# Patient Record
Sex: FEMALE | Race: WHITE | NOT HISPANIC OR LATINO | Employment: FULL TIME | URBAN - METROPOLITAN AREA
[De-identification: names, ages, dates, MRNs, and addresses within clinical notes are randomized per-mention and may not be internally consistent; named-entity substitution may affect disease eponyms.]

---

## 2017-05-15 ENCOUNTER — COMMUNICATION - HEALTHEAST (OUTPATIENT)
Dept: FAMILY MEDICINE | Facility: CLINIC | Age: 42
End: 2017-05-15

## 2017-05-15 DIAGNOSIS — N92.0 MENORRHAGIA: ICD-10-CM

## 2017-07-06 ENCOUNTER — COMMUNICATION - HEALTHEAST (OUTPATIENT)
Dept: TELEHEALTH | Facility: CLINIC | Age: 42
End: 2017-07-06

## 2017-07-06 ENCOUNTER — OFFICE VISIT - HEALTHEAST (OUTPATIENT)
Dept: INTERNAL MEDICINE | Facility: CLINIC | Age: 42
End: 2017-07-06

## 2017-07-06 DIAGNOSIS — G56.82 PINCHED NERVE IN SHOULDER, LEFT: ICD-10-CM

## 2017-07-06 DIAGNOSIS — M54.9 UPPER BACK PAIN ON LEFT SIDE: ICD-10-CM

## 2017-07-07 ENCOUNTER — COMMUNICATION - HEALTHEAST (OUTPATIENT)
Dept: INTERNAL MEDICINE | Facility: CLINIC | Age: 42
End: 2017-07-07

## 2017-07-10 ENCOUNTER — OFFICE VISIT - HEALTHEAST (OUTPATIENT)
Dept: FAMILY MEDICINE | Facility: CLINIC | Age: 42
End: 2017-07-10

## 2017-07-10 DIAGNOSIS — M25.512 LEFT SHOULDER PAIN: ICD-10-CM

## 2017-07-10 DIAGNOSIS — M54.9 MID BACK PAIN ON LEFT SIDE: ICD-10-CM

## 2017-07-10 ASSESSMENT — MIFFLIN-ST. JEOR: SCORE: 1723.17

## 2017-07-23 ENCOUNTER — COMMUNICATION - HEALTHEAST (OUTPATIENT)
Dept: SCHEDULING | Facility: CLINIC | Age: 42
End: 2017-07-23

## 2017-07-24 ENCOUNTER — OFFICE VISIT - HEALTHEAST (OUTPATIENT)
Dept: FAMILY MEDICINE | Facility: CLINIC | Age: 42
End: 2017-07-24

## 2017-07-24 DIAGNOSIS — M79.602 LEFT ARM PAIN: ICD-10-CM

## 2017-07-24 DIAGNOSIS — M54.12 CERVICAL RADICULOPATHY: ICD-10-CM

## 2017-07-25 ENCOUNTER — HOSPITAL ENCOUNTER (OUTPATIENT)
Dept: MRI IMAGING | Facility: HOSPITAL | Age: 42
Discharge: HOME | End: 2017-07-25
Attending: FAMILY MEDICINE

## 2017-07-25 DIAGNOSIS — M54.12 CERVICAL RADICULOPATHY: ICD-10-CM

## 2017-07-26 ENCOUNTER — AMBULATORY - HEALTHEAST (OUTPATIENT)
Dept: FAMILY MEDICINE | Facility: CLINIC | Age: 42
End: 2017-07-26

## 2017-07-26 ENCOUNTER — COMMUNICATION - HEALTHEAST (OUTPATIENT)
Dept: FAMILY MEDICINE | Facility: CLINIC | Age: 42
End: 2017-07-26

## 2017-07-26 DIAGNOSIS — M54.12 CERVICAL RADICULOPATHY AT C7: ICD-10-CM

## 2017-08-01 ENCOUNTER — HOSPITAL ENCOUNTER (OUTPATIENT)
Dept: PHYSICAL MEDICINE AND REHAB | Facility: CLINIC | Age: 42
Discharge: HOME | End: 2017-08-01
Attending: FAMILY MEDICINE

## 2017-08-01 DIAGNOSIS — M54.12 RADICULITIS OF LEFT CERVICAL REGION: ICD-10-CM

## 2017-08-01 DIAGNOSIS — M50.20 CERVICAL DISC HERNIATION: ICD-10-CM

## 2017-08-01 DIAGNOSIS — M48.02 CERVICAL STENOSIS OF SPINAL CANAL: ICD-10-CM

## 2017-08-01 RX ORDER — GABAPENTIN 100 MG/1
300 CAPSULE ORAL 3 TIMES DAILY
Qty: 270 CAPSULE | Refills: 3 | Status: SHIPPED | OUTPATIENT
Start: 2017-08-01

## 2017-08-02 ENCOUNTER — OFFICE VISIT - HEALTHEAST (OUTPATIENT)
Dept: PHYSICAL THERAPY | Facility: REHABILITATION | Age: 42
End: 2017-08-02

## 2017-08-02 DIAGNOSIS — R29.3 POOR POSTURE: ICD-10-CM

## 2017-08-02 DIAGNOSIS — M54.12 CERVICAL RADICULOPATHY: ICD-10-CM

## 2017-08-02 DIAGNOSIS — M43.6 STIFFNESS OF CERVICAL SPINE: ICD-10-CM

## 2017-08-04 ENCOUNTER — OFFICE VISIT - HEALTHEAST (OUTPATIENT)
Dept: PHYSICAL THERAPY | Facility: REHABILITATION | Age: 42
End: 2017-08-04

## 2017-08-04 DIAGNOSIS — R29.3 POOR POSTURE: ICD-10-CM

## 2017-08-04 DIAGNOSIS — M43.6 STIFFNESS OF CERVICAL SPINE: ICD-10-CM

## 2017-08-04 DIAGNOSIS — M54.12 CERVICAL RADICULOPATHY: ICD-10-CM

## 2017-08-07 ENCOUNTER — OFFICE VISIT - HEALTHEAST (OUTPATIENT)
Dept: PHYSICAL THERAPY | Facility: REHABILITATION | Age: 42
End: 2017-08-07

## 2017-08-07 DIAGNOSIS — R29.3 POOR POSTURE: ICD-10-CM

## 2017-08-07 DIAGNOSIS — M43.6 STIFFNESS OF CERVICAL SPINE: ICD-10-CM

## 2017-08-07 DIAGNOSIS — M54.12 CERVICAL RADICULOPATHY: ICD-10-CM

## 2017-08-09 ENCOUNTER — OFFICE VISIT - HEALTHEAST (OUTPATIENT)
Dept: PHYSICAL THERAPY | Facility: REHABILITATION | Age: 42
End: 2017-08-09

## 2017-08-09 DIAGNOSIS — M43.6 STIFFNESS OF CERVICAL SPINE: ICD-10-CM

## 2017-08-09 DIAGNOSIS — M54.12 CERVICAL RADICULOPATHY: ICD-10-CM

## 2017-08-09 DIAGNOSIS — R29.3 POOR POSTURE: ICD-10-CM

## 2017-08-14 ENCOUNTER — OFFICE VISIT - HEALTHEAST (OUTPATIENT)
Dept: PHYSICAL THERAPY | Facility: REHABILITATION | Age: 42
End: 2017-08-14

## 2017-08-14 DIAGNOSIS — M43.6 STIFFNESS OF CERVICAL SPINE: ICD-10-CM

## 2017-08-14 DIAGNOSIS — M54.12 CERVICAL RADICULOPATHY: ICD-10-CM

## 2017-08-14 DIAGNOSIS — R29.3 POOR POSTURE: ICD-10-CM

## 2017-08-16 ENCOUNTER — OFFICE VISIT - HEALTHEAST (OUTPATIENT)
Dept: PHYSICAL THERAPY | Facility: REHABILITATION | Age: 42
End: 2017-08-16

## 2017-08-16 DIAGNOSIS — M43.6 STIFFNESS OF CERVICAL SPINE: ICD-10-CM

## 2017-08-16 DIAGNOSIS — R29.3 POOR POSTURE: ICD-10-CM

## 2017-08-16 DIAGNOSIS — M54.12 CERVICAL RADICULOPATHY: ICD-10-CM

## 2017-08-21 ENCOUNTER — OFFICE VISIT - HEALTHEAST (OUTPATIENT)
Dept: PHYSICAL THERAPY | Facility: REHABILITATION | Age: 42
End: 2017-08-21

## 2017-08-21 DIAGNOSIS — M43.6 STIFFNESS OF CERVICAL SPINE: ICD-10-CM

## 2017-08-21 DIAGNOSIS — M54.12 CERVICAL RADICULOPATHY: ICD-10-CM

## 2017-08-21 DIAGNOSIS — R29.3 POOR POSTURE: ICD-10-CM

## 2017-08-22 ENCOUNTER — COMMUNICATION - HEALTHEAST (OUTPATIENT)
Dept: PHYSICAL MEDICINE AND REHAB | Facility: CLINIC | Age: 42
End: 2017-08-22

## 2017-08-25 ENCOUNTER — OFFICE VISIT - HEALTHEAST (OUTPATIENT)
Dept: PHYSICAL THERAPY | Facility: REHABILITATION | Age: 42
End: 2017-08-25

## 2017-08-25 DIAGNOSIS — M54.12 CERVICAL RADICULOPATHY: ICD-10-CM

## 2017-08-25 DIAGNOSIS — M43.6 STIFFNESS OF CERVICAL SPINE: ICD-10-CM

## 2017-08-25 DIAGNOSIS — R29.3 POOR POSTURE: ICD-10-CM

## 2017-08-28 ENCOUNTER — OFFICE VISIT - HEALTHEAST (OUTPATIENT)
Dept: PHYSICAL THERAPY | Facility: REHABILITATION | Age: 42
End: 2017-08-28

## 2017-08-28 DIAGNOSIS — M43.6 STIFFNESS OF CERVICAL SPINE: ICD-10-CM

## 2017-08-28 DIAGNOSIS — R29.3 POOR POSTURE: ICD-10-CM

## 2017-08-28 DIAGNOSIS — M54.12 CERVICAL RADICULOPATHY: ICD-10-CM

## 2017-08-30 ENCOUNTER — HOSPITAL ENCOUNTER (OUTPATIENT)
Dept: PHYSICAL MEDICINE AND REHAB | Facility: CLINIC | Age: 42
Discharge: HOME | End: 2017-08-30
Attending: NURSE PRACTITIONER

## 2017-08-30 DIAGNOSIS — M48.02 CERVICAL STENOSIS OF SPINAL CANAL: ICD-10-CM

## 2017-08-30 DIAGNOSIS — M50.20 CERVICAL DISC HERNIATION: ICD-10-CM

## 2017-08-30 DIAGNOSIS — M54.12 RADICULITIS OF LEFT CERVICAL REGION: ICD-10-CM

## 2017-09-01 ENCOUNTER — HOSPITAL ENCOUNTER (OUTPATIENT)
Dept: PHYSICAL MEDICINE AND REHAB | Facility: CLINIC | Age: 42
Discharge: HOME | End: 2017-09-01
Attending: PHYSICAL MEDICINE & REHABILITATION

## 2017-09-01 ENCOUNTER — OFFICE VISIT - HEALTHEAST (OUTPATIENT)
Dept: PHYSICAL THERAPY | Facility: REHABILITATION | Age: 42
End: 2017-09-01

## 2017-09-01 DIAGNOSIS — M50.20 CERVICAL DISC HERNIATION: ICD-10-CM

## 2017-09-01 DIAGNOSIS — M43.6 STIFFNESS OF CERVICAL SPINE: ICD-10-CM

## 2017-09-01 DIAGNOSIS — M54.12 CERVICAL RADICULOPATHY: ICD-10-CM

## 2017-09-01 DIAGNOSIS — M54.12 RADICULITIS OF LEFT CERVICAL REGION: ICD-10-CM

## 2017-09-01 DIAGNOSIS — R29.3 POOR POSTURE: ICD-10-CM

## 2017-09-08 ENCOUNTER — OFFICE VISIT - HEALTHEAST (OUTPATIENT)
Dept: PHYSICAL THERAPY | Facility: REHABILITATION | Age: 42
End: 2017-09-08

## 2017-09-08 DIAGNOSIS — M54.12 CERVICAL RADICULOPATHY: ICD-10-CM

## 2017-09-08 DIAGNOSIS — M43.6 STIFFNESS OF CERVICAL SPINE: ICD-10-CM

## 2017-09-08 DIAGNOSIS — R29.3 POOR POSTURE: ICD-10-CM

## 2017-09-13 ENCOUNTER — OFFICE VISIT - HEALTHEAST (OUTPATIENT)
Dept: PHYSICAL THERAPY | Facility: REHABILITATION | Age: 42
End: 2017-09-13

## 2017-09-13 DIAGNOSIS — M43.6 STIFFNESS OF CERVICAL SPINE: ICD-10-CM

## 2017-09-13 DIAGNOSIS — R29.3 POOR POSTURE: ICD-10-CM

## 2017-09-13 DIAGNOSIS — M54.12 CERVICAL RADICULOPATHY: ICD-10-CM

## 2017-09-15 ENCOUNTER — HOSPITAL ENCOUNTER (OUTPATIENT)
Dept: PHYSICAL MEDICINE AND REHAB | Facility: CLINIC | Age: 42
Discharge: HOME | End: 2017-09-15
Attending: NURSE PRACTITIONER

## 2017-09-15 DIAGNOSIS — M50.20 CERVICAL DISC HERNIATION: ICD-10-CM

## 2017-09-15 DIAGNOSIS — M54.12 RADICULITIS OF LEFT CERVICAL REGION: ICD-10-CM

## 2017-09-15 DIAGNOSIS — M79.18 CERVICAL MYOFASCIAL PAIN SYNDROME: ICD-10-CM

## 2017-09-15 DIAGNOSIS — M48.02 CERVICAL STENOSIS OF SPINAL CANAL: ICD-10-CM

## 2017-09-15 RX ORDER — NAPROXEN 500 MG/1
500 TABLET ORAL 2 TIMES DAILY PRN
Qty: 42 TABLET | Refills: 1 | Status: SHIPPED | OUTPATIENT
Start: 2017-09-15

## 2017-09-15 RX ORDER — CYCLOBENZAPRINE HCL 5 MG
5-10 TABLET ORAL 3 TIMES DAILY PRN
Qty: 42 TABLET | Refills: 1 | Status: SHIPPED | OUTPATIENT
Start: 2017-09-15

## 2017-09-20 ENCOUNTER — OFFICE VISIT - HEALTHEAST (OUTPATIENT)
Dept: PHYSICAL THERAPY | Facility: REHABILITATION | Age: 42
End: 2017-09-20

## 2017-09-20 DIAGNOSIS — R29.3 POOR POSTURE: ICD-10-CM

## 2017-09-20 DIAGNOSIS — M54.12 CERVICAL RADICULOPATHY: ICD-10-CM

## 2017-09-20 DIAGNOSIS — M43.6 STIFFNESS OF CERVICAL SPINE: ICD-10-CM

## 2017-09-27 ENCOUNTER — OFFICE VISIT - HEALTHEAST (OUTPATIENT)
Dept: PHYSICAL THERAPY | Facility: REHABILITATION | Age: 42
End: 2017-09-27

## 2017-09-27 DIAGNOSIS — M54.12 CERVICAL RADICULOPATHY: ICD-10-CM

## 2017-09-27 DIAGNOSIS — R29.3 POOR POSTURE: ICD-10-CM

## 2017-09-27 DIAGNOSIS — M43.6 STIFFNESS OF CERVICAL SPINE: ICD-10-CM

## 2017-10-09 ENCOUNTER — OFFICE VISIT - HEALTHEAST (OUTPATIENT)
Dept: PHYSICAL THERAPY | Facility: REHABILITATION | Age: 42
End: 2017-10-09

## 2017-10-09 DIAGNOSIS — R29.3 POOR POSTURE: ICD-10-CM

## 2017-10-09 DIAGNOSIS — M43.6 STIFFNESS OF CERVICAL SPINE: ICD-10-CM

## 2017-10-09 DIAGNOSIS — M54.12 CERVICAL RADICULOPATHY: ICD-10-CM

## 2017-10-12 ENCOUNTER — OFFICE VISIT - HEALTHEAST (OUTPATIENT)
Dept: FAMILY MEDICINE | Facility: CLINIC | Age: 42
End: 2017-10-12

## 2017-10-12 DIAGNOSIS — N92.0 MENORRHAGIA: ICD-10-CM

## 2017-10-12 DIAGNOSIS — E66.9 OBESITY: ICD-10-CM

## 2017-10-12 DIAGNOSIS — Z23 NEED FOR IMMUNIZATION AGAINST INFLUENZA: ICD-10-CM

## 2017-10-12 DIAGNOSIS — R03.0 ELEVATED BLOOD PRESSURE READING: ICD-10-CM

## 2017-10-12 DIAGNOSIS — Z00.00 HEALTH CARE MAINTENANCE: ICD-10-CM

## 2017-10-12 LAB
CHOLEST SERPL-MCNC: 143 MG/DL
FASTING STATUS PATIENT QL REPORTED: YES
HDLC SERPL-MCNC: 41 MG/DL
LDLC SERPL CALC-MCNC: 73 MG/DL
TRIGL SERPL-MCNC: 146 MG/DL

## 2017-10-12 ASSESSMENT — MIFFLIN-ST. JEOR: SCORE: 1766.55

## 2017-10-17 LAB
HPV INTERPRETATION - HISTORICAL: NORMAL
HPV INTERPRETER - HISTORICAL: NORMAL

## 2017-10-18 LAB
BKR LAB AP ABNORMAL BLEEDING: NO
BKR LAB AP BIRTH CONTROL/HORMONES: NORMAL
BKR LAB AP CERVICAL APPEARANCE: NORMAL
BKR LAB AP GYN ADEQUACY: NORMAL
BKR LAB AP GYN INTERPRETATION: NORMAL
BKR LAB AP HPV REFLEX: NORMAL
BKR LAB AP LMP: NORMAL
BKR LAB AP PATIENT STATUS: NORMAL
BKR LAB AP PREVIOUS ABNORMAL: NORMAL
BKR LAB AP PREVIOUS NORMAL: 2014
HIGH RISK?: NO
PATH REPORT.COMMENTS IMP SPEC: NORMAL
RESULT FLAG (HE HISTORICAL CONVERSION): NORMAL

## 2017-10-25 ENCOUNTER — OFFICE VISIT - HEALTHEAST (OUTPATIENT)
Dept: PHYSICAL THERAPY | Facility: REHABILITATION | Age: 42
End: 2017-10-25

## 2017-10-25 DIAGNOSIS — M54.12 CERVICAL RADICULOPATHY: ICD-10-CM

## 2017-10-25 DIAGNOSIS — M43.6 STIFFNESS OF CERVICAL SPINE: ICD-10-CM

## 2017-10-25 DIAGNOSIS — R29.3 POOR POSTURE: ICD-10-CM

## 2017-11-09 ENCOUNTER — COMMUNICATION - HEALTHEAST (OUTPATIENT)
Dept: FAMILY MEDICINE | Facility: CLINIC | Age: 42
End: 2017-11-09

## 2017-11-15 ENCOUNTER — COMMUNICATION - HEALTHEAST (OUTPATIENT)
Dept: FAMILY MEDICINE | Facility: CLINIC | Age: 42
End: 2017-11-15

## 2017-11-15 ENCOUNTER — OFFICE VISIT - HEALTHEAST (OUTPATIENT)
Dept: PHYSICAL THERAPY | Facility: REHABILITATION | Age: 42
End: 2017-11-15

## 2017-11-15 DIAGNOSIS — M43.6 STIFFNESS OF CERVICAL SPINE: ICD-10-CM

## 2017-11-15 DIAGNOSIS — M54.12 CERVICAL RADICULOPATHY: ICD-10-CM

## 2017-11-15 DIAGNOSIS — R29.3 POOR POSTURE: ICD-10-CM

## 2017-11-16 ENCOUNTER — COMMUNICATION - HEALTHEAST (OUTPATIENT)
Dept: FAMILY MEDICINE | Facility: CLINIC | Age: 42
End: 2017-11-16

## 2017-11-29 ENCOUNTER — OFFICE VISIT - HEALTHEAST (OUTPATIENT)
Dept: FAMILY MEDICINE | Facility: CLINIC | Age: 42
End: 2017-11-29

## 2017-11-29 DIAGNOSIS — N92.0 MENORRHAGIA: ICD-10-CM

## 2017-11-29 DIAGNOSIS — R03.0 ELEVATED BLOOD PRESSURE READING: ICD-10-CM

## 2017-11-29 DIAGNOSIS — Z01.818 PRE-OPERATIVE CLEARANCE: ICD-10-CM

## 2017-11-29 ASSESSMENT — MIFFLIN-ST. JEOR: SCORE: 1762.58

## 2017-12-04 ASSESSMENT — MIFFLIN-ST. JEOR: SCORE: 1748.12

## 2017-12-05 ENCOUNTER — ANESTHESIA - HEALTHEAST (OUTPATIENT)
Dept: SURGERY | Facility: AMBULATORY SURGERY CENTER | Age: 42
End: 2017-12-05

## 2017-12-06 ENCOUNTER — SURGERY - HEALTHEAST (OUTPATIENT)
Dept: SURGERY | Facility: AMBULATORY SURGERY CENTER | Age: 42
End: 2017-12-06

## 2017-12-06 ASSESSMENT — MIFFLIN-ST. JEOR: SCORE: 1748.12

## 2018-05-11 ENCOUNTER — TELEPHONE (OUTPATIENT)
Dept: OTHER | Facility: CLINIC | Age: 43
End: 2018-05-11

## 2018-05-11 NOTE — TELEPHONE ENCOUNTER
5/11/2018    Call Regarding [facility]boarding [facility] other     Attempt 1    Message on voicemail    Comments:       Outreach   Kaity Giraldo

## 2018-05-30 NOTE — TELEPHONE ENCOUNTER
5/30/2018    Call Regarding [facility]boarding [facility] OTHER    Attempt 2    Message on voicemail     Comments: no dep      Outreach   SARTHAK

## 2018-06-11 NOTE — TELEPHONE ENCOUNTER
6/11/2018    Call Regarding Onboarding [facility] OTHER     Attempt 3    Message on voicemail     Comments:       Outreach   SB

## 2021-05-31 VITALS — HEIGHT: 65 IN | WEIGHT: 239.25 LBS | BODY MASS INDEX: 39.86 KG/M2

## 2021-05-31 VITALS — BODY MASS INDEX: 41.74 KG/M2 | WEIGHT: 247 LBS

## 2021-05-31 VITALS — BODY MASS INDEX: 41.09 KG/M2 | WEIGHT: 243.13 LBS

## 2021-05-31 VITALS — BODY MASS INDEX: 41.07 KG/M2 | WEIGHT: 243 LBS

## 2021-05-31 VITALS — HEIGHT: 65 IN | WEIGHT: 247.06 LBS | BODY MASS INDEX: 41.16 KG/M2

## 2021-05-31 VITALS — WEIGHT: 243 LBS | BODY MASS INDEX: 40.48 KG/M2 | HEIGHT: 65 IN

## 2021-05-31 VITALS — WEIGHT: 242.4 LBS | BODY MASS INDEX: 40.34 KG/M2

## 2021-05-31 VITALS — BODY MASS INDEX: 41.02 KG/M2 | WEIGHT: 246.19 LBS | HEIGHT: 65 IN

## 2021-06-11 NOTE — PROGRESS NOTES
Assessment/Plan:     1. Mid back pain on left side  XR Cervical Spine 2 - 3 VWS    XR Thoracic Spine 2 VWS   2. Left shoulder pain         Diagnoses and all orders for this visit:    Mid back pain on left side  -     XR Cervical Spine 2 - 3 VWS  -     XR Thoracic Spine 2 VWS  -Discussed x-ray findings with patient.  Some mild degenerative changes noted.  -Discussed that symptoms are consistent with a pinched nerve.  Discussed treatment options.  Elected to treat with Medrol Dosepak.  Prescription sent to pharmacy.  Counseled on use of medication and side effects.  Will also treat with cyclobenzaprine to help with muscle relaxation.  Prescription for this was also sent to pharmacy and she was counseled on use of medication and common side effects.  She has a few tablets left of tramadol.  She can continue to use these.  Do not combine with cyclobenzaprine due to risk of drowsiness and respiratory depression.  Follow-up if symptoms not improving with these measures.  She can also continue with topical heat, ice and massage as well as stretching exercises.  She was also advised to avoid NSAIDs while taking the Medrol Dosepak but she is okay to take acetaminophen.    Left shoulder pain    Other orders  -     methylPREDNISolone (MEDROL DOSEPACK) 4 mg tablet; follow package directions  Dispense: 21 tablet; Refill: 0  -     cyclobenzaprine (FLEXERIL) 5 MG tablet; Take 1 tablet (5 mg total) by mouth 3 (three) times a day as needed for muscle spasms.  Dispense: 30 tablet; Refill: 0           Subjective:      Lorraine Knapp is a 41 y.o. female who comes into clinic today with concern about left shoulder and upper back pain.  She reports that she had a change in position at work and is now doing more of a desk job.  For the last 3 or 4 weeks she has noticed some intermittent achiness in the left shoulder and arm as well as occasional tingling in the fingers of her left hand.  She has not had any recent trauma or  injuries.  She did see a chiropractor on June 27.  An adjustment was done.  The following week she developed increased and more persistent/constant pain.  She was not able to sleep because of the discomfort.  She went to the walk-in clinic a couple of days ago.  No imaging was done.  She was diagnosed with a pinched nerve.  She was given tramadol.  She has been taking this and this is providing minimal relief.  She had to miss work Thursday and Friday of last week.  She has been sleeping in a recliner because she cannot get comfortable in her bed.  Pain is located in the left upper back area around the scapula and goes down into the left arm and forearm.  She has noticed a little bit of weakness in the left arm.  She has difficulty holding her cereal bowl with her left hand.  She has also tried Advil and Aleve.  Has tried topical heat and ice as well as a salon pause patch.  She has tried massage.  None of these methods have provided any significant relief of her discomfort.  She feels that there is a tight muscle knot in the area that she is not able to work out.  We reviewed allergies and medications.  She has no other concerns or questions today.    Current Outpatient Prescriptions   Medication Sig Dispense Refill     cetirizine (ZYRTEC) 10 mg cap Take 10 mg by mouth daily. 30 capsule 0     levonorgestrel-ethinyl estradiol (AVIANE,ALESSE,LESSINA) 0.1-20 mg-mcg per tablet Take 1 tablet by mouth daily. 90 tablet 1     multivitamin therapeutic (THERAGRAN) tablet Take 1 tablet by mouth daily.       traMADol (ULTRAM) 50 mg tablet Take 1 tablet (50 mg total) by mouth every 6 (six) hours. 15 tablet 0     cyclobenzaprine (FLEXERIL) 5 MG tablet Take 1 tablet (5 mg total) by mouth 3 (three) times a day as needed for muscle spasms. 30 tablet 0     methylPREDNISolone (MEDROL DOSEPACK) 4 mg tablet follow package directions 21 tablet 0     No current facility-administered medications for this visit.        Past Medical  "History, Family History, and Social History reviewed.  Past Medical History:   Diagnosis Date     Seasonal allergies      TMJ (temporomandibular joint disorder)      Past Surgical History:   Procedure Laterality Date      SECTION      and 2008     TEMPOROMANDIBULAR JOINT SURGERY Bilateral 1994     Review of patient's allergies indicates no known allergies.  Family History   Problem Relation Age of Onset     Diabetes Mother      Hyperlipidemia Mother      Hypertension Mother      No Medical Problems Son      Diabetes Maternal Uncle      Hyperlipidemia Maternal Grandmother      Diabetes Maternal Grandfather      No Medical Problems Son      Prostate cancer Maternal Uncle      Social History     Social History     Marital status:      Spouse name: N/A     Number of children: N/A     Years of education: N/A     Occupational History     Not on file.     Social History Main Topics     Smoking status: Never Smoker     Smokeless tobacco: Not on file     Alcohol use No     Drug use: No     Sexual activity: Yes     Partners: Male     Birth control/ protection: Pill, Surgical     Other Topics Concern     Not on file     Social History Narrative         Review of systems is as stated in HPI, and the remainder of the 10 system review is otherwise negative.    Objective:     Vitals:    07/10/17 1423 07/10/17 1514   BP: (!) 152/98 150/80   Patient Site: Right Arm    Patient Position: Sitting    Cuff Size: Adult Large    Pulse: 95    Temp: 98.1  F (36.7  C)    TempSrc: Tympanic    SpO2: 98%    Weight: (!) 239 lb 4 oz (108.5 kg)    Height: 5' 4.5\" (1.638 m)     Body mass index is 40.43 kg/(m^2).    General appearance: alert, appears stated age and cooperative  Head: Normocephalic, without obvious abnormality, atraumatic  Neck: supple, symmetrical, trachea midline  Extremities: extremities normal, atraumatic, no cyanosis or edema  Neurologic: Grossly normal; normal reflexes and strength in bilateral upper " extremities.  Musculoskeletal: No tenderness over cervical or thoracic spine.  There is some tenderness in the left trapezius muscle between the scapula and spine.  No tenderness in the left acromioclavicular joint or in the region of the left supraspinatus tendon or left biceps tendon.  On range of motion testing, she indicates discomfort with extremes of abduction and internal range of motion in the left arm.  She has discomfort with flexion and extension movements of the neck as well as turning head from left to right    Results: X-ray of the cervical and thoracic spine were performed in clinic.  I personally reviewed these x-rays.  These showed some mild degenerative changes but no other significant bony abnormalities.          This note has been dictated using voice recognition software. Any grammatical or context distortions are unintentional and inherent to the the software.

## 2021-06-11 NOTE — PROGRESS NOTES
[facility] Note  Patient Name: Lorraine Knapp  Patient Age: 41 y.o.  YOB: 1975  MRN: [id number]  ?  Date of Visit: 7/6/2017  Reason for Office Visit:   Chief Complaint   Patient presents with     Shoulder Pain     Left x 2 weeks Pain started atfer having a adjustment        HPI: Lorraine Knapp 41 y.o. female who presents to clinic for left upper back pain x 2 weeks. Started after having an adjustment at the chiropractor. Pain is located around upper trapezius and has some pinching radiating down right arm. No weakness, numbness. More pain associated with certain positions. Worse at night when lying on back the past 2 nights. Took ibuprofen which helps.     Review of Systems: As noted in HPI     Current Scheduled Meds:  Outpatient Encounter Prescriptions as of 7/6/2017   Medication Sig Dispense Refill     cetirizine (ZYRTEC) 10 mg cap Take 10 mg by mouth daily. 30 capsule 0     levonorgestrel-ethinyl estradiol (AVIANE,ALESSE,LESSINA) 0.1-20 mg-mcg per tablet Take 1 tablet by mouth daily. 90 tablet 1     multivitamin therapeutic (THERAGRAN) tablet Take 1 tablet by mouth daily.       diphenhydrAMINE (BENADRYL) 25 mg tablet Take 25 mg by mouth as needed for itching or sleep.       fexofenadine (ALLEGRA) 180 MG tablet Take 180 mg by mouth daily.       No facility-administered encounter medications on file as of 7/6/2017.        Objective / Physical Examination:  /80  Pulse 86  Temp 98.3  F (36.8  C) (Oral)   Wt (!) 242 lb 6.4 oz (110 kg)  BMI 40.34 kg/m2  Wt Readings from Last 3 Encounters:   07/06/17 (!) 242 lb 6.4 oz (110 kg)   07/03/16 (!) 240 lb 12.8 oz (109.2 kg)   04/27/16 (!) 238 lb 4 oz (108.1 kg)     Body mass index is 40.34 kg/(m^2). (>25?)    General Appearance: Alert and oriented in no acute distress  Back: Symmetrical and nontender  Extremities: Shoulder:  Symmetrical bilaterally, FROM flex/ex/IR/ER/abduction/adduction, No erythema or edema, Nontender to  palpation, Negative:  DardenFreddie velazquez, [place], Speeds, empty can, 5/5 strength biceps/triceps/, Radial pulse full. Cap refill <2 seconds, Sensory intact to light touch distally.  Integumentary: Warm and dry. Without suspicious looking lesions  Neuro: Alert and oriented, follows commands appropriately. Grossly normal. Cranial nerves II-XII intact and normal. Gait normal     Assessment / Plan / Medical Decision Making:      Encounter Diagnoses   Name Primary?     Upper back pain on left side Yes     Pinched nerve in shoulder, left         1. Upper back pain on left side    2. Pinched nerve in shoulder, left    Likely strain or nerve impingement. spurling, neer's negative   Recommend stretching exercises, massage, heat/ice  Take 2 aleve every 12 hours for nex 3 days with food   Would avoid chiropractic adjustments for now     Total time spent with patient was 15 minutes with >50% of time spent in face-to-face counseling regarding the above plan     Nir New MD  [facility]

## 2021-06-12 NOTE — PROGRESS NOTES
ASSESSMENT: Lorraine Knapp is a 41 y.o. female presents for consultation at the request of HE PCP Kisha Hernandez MD, with past medical history significant for overweight, who presents today for new patient evaluation of acute left cervical radiculitis in a C7 dermatomal pattern ongoing ×1 month with paresthesias.    It is strong on exam however does have sensation deficit in a C7 dermatomal pattern.     NDI Score: 24     WHO-5: 19, patient not interested in behavioral health services     PHQ-2: 2    Diagnoses and all orders for this visit:    Radiculitis of left cervical region  -     Ambulatory referral to PT/OT  -     gabapentin (NEURONTIN) 100 MG capsule; Take 300 mg by mouth 3 (three) times a day. Follow Gabapentin Dosing chart given  Dispense: 270 capsule; Refill: 3  -     naproxen (EC NAPROSYN) 500 MG EC tablet; Take 1 tablet (500 mg total) by mouth 3 (three) times a day as needed.  Dispense: 42 tablet; Refill: 1  -     HYDROcodone-acetaminophen 5-325 mg per tablet; Take 1 tablet by mouth 2 (two) times a day as needed for pain (Severe pain).  Dispense: 14 tablet; Refill: 0    Cervical disc herniation  -     Ambulatory referral to PT/OT    Cervical stenosis of spinal canal  -     Ambulatory referral to PT/OT       PLAN:  Reviewed spine anatomy and disease process. Discussed diagnosis and treatment options with the patient today. A shared decision making model was used. The patient's values and choices were respected. The following represents what was discussed and decided upon by the provider and the patient.     -DIAGNOSTIC TESTS:  Images were personally reviewed and interpreted.   --Reviewed cervical spine MRI today which reveals C6-7 superimposed small left posterior lateral disc protrusion with moderate central canal stenosis and effacement of the left C7 nerve root with mild bilateral foraminal stenosis.  There is also a C5-6 shallow left paracentral disc protrusion that looks less pronounced also  contributing to moderate central canal stenosis with left C6 nerve root compression however no foraminal stenosis noted.  Otherwise degenerative spondylosis noted.    -PHYSICAL THERAPY: Physical therapy [facility] for core strengthening and nerve glide exercises.  Discussed the importance of core strengthening, ROM, stretching exercises with the patient and how each of these entities is important in decreasing pain.  Explained to the patient that the purpose of physical therapy is to teach the patient a home exercise program.  These exercises need to be performed every day in order to decrease pain and prevent future occurrences of pain.  Likened it to brushing one's teeth.      -MEDICATIONS: Prescribed gabapentin 100 mg to titrate up to 3 tablets 3 times a day as tolerated for radicular pain.  -Prescribed naproxen 500 mg 1 tablet 3 times daily as needed for pain.  -Also refilled hydrocodone 5/325 mg, 1 tablet twice daily as needed for severe breakthrough pain, #14 tablets given for 7 days worth.  Discussed side effects of medications and proper use. Patient verbalized understanding.    -INTERVENTIONS: Discussed the possibility of a left C6-7 transfemoral epidural steroid injection to see if he can get relief of her left cervical radiculitis in a C7 dermatomal pattern.  Is a left posterolateral disc protrusion with moderate central canal stenosis and left C7 nerve root compression, however there is also left disc herniation at C5-6.    -PATIENT EDUCATION: 35 minutes of total visit time was spent face to face with the patient today, 60 % of the visit was spent on counseling, education, and coordinating care.   -10  minutes spent outside of visit time, non-face-to-face time, reviewing chart.    -FOLLOW-UP:   Follow-up in 4 weeks, sooner if symptoms are worsening or if weakness occurs at any time.    Advised pt to call the [facility] if symptoms worsen or you have problems controlling bladder and bowel  function.   ______________________________________________________________________    SUBJECTIVE:   Lorraine Knapp  is a 41 y.o. female who presents today for new patient evaluation of neck pain on the left that does radiate into the left bicep and forearm as well as the second and third fingers on the left ongoing for 1 month with no known injury, currently her pain is a 4/10 and she does endorse numbness and tingling in the same pattern and constant tingling into the pointer finger more significantly.  Patient denies weakness, she reports she is right-handed, denies right arm symptoms.  Denies prior history of cervical radicular symptoms.    No myelopathic or red flag symptoms.  She denies balance changes, denies recent trips or falls, denies bowel or bladder dysfunction.  Eyes difficulties with fine motor skills such as buttoning buttons or turning keys.    Treatment to Date: No prior physical therapy or chiropractic care.  No prior spinal surgery.    Medications:  Tramadol with no relief.  Vicodin at nighttime with some relief.    Ibuprofen 600 mg 3-4 times daily with some relief.    Current Outpatient Prescriptions on File Prior to Encounter   Medication Sig Dispense Refill     cetirizine (ZYRTEC) 10 mg cap Take 10 mg by mouth daily. 30 capsule 0     cyclobenzaprine (FLEXERIL) 5 MG tablet Take 1 tablet (5 mg total) by mouth 3 (three) times a day as needed for muscle spasms. 30 tablet 1     levonorgestrel-ethinyl estradiol (AVIANE,ALESSE,LESSINA) 0.1-20 mg-mcg per tablet Take 1 tablet by mouth daily. 90 tablet 1     multivitamin therapeutic (THERAGRAN) tablet Take 1 tablet by mouth daily.       [DISCONTINUED] HYDROcodone-acetaminophen 5-325 mg per tablet Take 1 tablet by mouth every 6 (six) hours as needed for pain. 10 tablet 0     No current facility-administered medications on file prior to encounter.        No Known Allergies    Past Medical History:   Diagnosis Date     Seasonal allergies      TMJ  (temporomandibular joint disorder)         There is no problem list on file for this patient.      Past Surgical History:   Procedure Laterality Date      SECTION      and      TEMPOROMANDIBULAR JOINT SURGERY Bilateral        Family History   Problem Relation Age of Onset     Diabetes Mother      Hyperlipidemia Mother      Hypertension Mother      No Medical Problems Son      Diabetes Maternal Uncle      Hyperlipidemia Maternal Grandmother      Diabetes Maternal Grandfather      No Medical Problems Son      Prostate cancer Maternal Uncle        SOCIAL HX: Patient does work as a massage therapist.  Patient denies smoking history, denies alcohol use.    ROS: Positive for poor sleep quality, back pain, joint pain, depression/worry.  Specifically negative for bowel/bladder dysfunction, balance changes, headache, dizziness, foot drop, fevers, chills, appetite changes, nausea/vomiting, unexplained weight loss. Otherwise 13 systems reviewed are negative. Please see the patient's intake questionnaire from today for details.    OBJECTIVE:  BP (!) 163/96 (Patient Site: Left Arm, Patient Position: Sitting)  Pulse (!) 105  Wt (!) 243 lb (110.2 kg)  LMP 2017  SpO2 96%  BMI 41.07 kg/m2    PHYSICAL EXAMINATION:  --CONSTITUTIONAL: Vital signs as above. No acute distress. The patient is well nourished and well groomed.  --PSYCHIATRIC: The patient is awake, alert, oriented to person, place, time and answering questions appropriately with clear speech. Appropriate mood and affect   --HEENT: Sclera are non-injected. Extraocular muscles are intact. Thyroid moves easily upon swallowing.  Moist oral mucosa.  --SKIN: Skin over the face, bilateral lower extremities, and posterior torso is clean, dry, intact without rashes.  --LYMPH NODES: No palpable or tender anterior/posterior cervical, submandibular, or supraclavicular lymph nodes.    --RESPIRATORY: Normal rhythm and effort. No abnormal accessory muscle  breathing patterns noted.   --GROSS MOTOR: Easily arises from a seated position. Toe walking and heel walking are normal.    --CERVICAL SPINE: Inspection reveals no evidence of deformity. Range of motion is not limited in cervical flexion, extension, lateral rotation. No tenderness to palpation.  Spurling maneuver negative bilaterally.  --SHOULDERS: Full range of motion bilaterally. Negative empty can.  --UPPER EXTREMITY MOTOR TESTING:  Wrist flexion left 5/5, right 5/5  Wrist extension left 5/5, right 5/5  Pronators left 5/5, right 5/5  Biceps left 5/5, right 5/5   Triceps left 5/5, right 5/5   Shoulder abduction left 5/5, right 5/5   left 5/5, right 5/5  --NEUROLOGIC: CN III-XII are grossly intact. 2/4 symmetric biceps, brachioradialis, triceps reflexes bilaterally. Sensation to upper extremities is diminished in a C7 dermatomal pattern on the left only, intact on the right.  Negative Squires's bilaterally.    --VASCULAR: 2/4 radial pulses bilaterally. Warm upper limbs bilaterally. Capillary refill in the upper extremities is less than 1 second.    RESULTS: Prior medical records from 7/6/2017 current were reviewed today.     Imaging: MRI of the cervical spine was personally reviewed and interpreted today. The images were shown to the patient and the findings were explained using a spine model.      Xr Cervical Spine 2 - 3 Vws  Xr Thoracic Spine 2 Vws  Result Date: 7/11/2017  XR CERVICAL SPINE 2 - 3 VWS, XR THORACIC SPINE 2 VWS 7/10/2017 2:55 PM INDICATION: upper back pain radiating to left arm COMPARISON: None. FINDINGS: Cervical spine: The cervical spinal is visualized to the C7 vertebral body on the lateral view. There is mild retrolisthesis of C3 on C4, mild anterolisthesis of C4 on C5. There is no acute fracture. There is mild loss of disc height space at C5-C6 with endplate osteophyte formation. No prevertebral soft tissue swelling. No nodules in the visualized lung apices. Open-mouth odontoid view is  suboptimal secondary to overlapping structures. The spinous process of C2 projects over the space between the right lateral mass of C1 and the dens, possibly on the basis of patient rotation. Mandibular fixation hardware is noted. Thoracic spine: The cervicothoracic junction is not well evaluated secondary to overlapping structures. Within this constraint, there is mild dextroconvex curvature of the midthoracic spine and levoconvex curvature of the lower thoracic spine. There is no acute fracture  in the visualized portions. The visualized lung parenchyma is well aerated.       Mr Cervical Spine Without Contrast  Result Date: 7/26/2017  INDICATION: Neck pain extending into left shoulder and arm. Symptoms five weeks. TECHNIQUE: Unenhanced. COMPARISON: None. FINDINGS: C2-C3: Normal disc height. Mild degenerative facet arthropathy.. No significant central canal or neural foraminal stenosis. C3-C4: Normal disc height. Mild posterolateral spondylotic ridging on the left. Annular bulge. Small shallow left paracentral disc protrusion. Mild degenerative facet arthropathy. Mild to moderate central canal stenosis, there is mild deformity and flattening of the ventral surface of the cervical cord to the left of midline. No significant foraminal stenosis. C4-C5: Normal disc height. Slight posterolateral spondylotic ridging eccentric right. There is likely a small right posterolateral disc protrusion. Facet joints negative. Mild central canal stenosis. Mild right foraminal stenosis. C5-C6: Mild disc space loss. Interbody spurring and spondylotic ridging. Annular bulge and likely a shallow left paracentral disc protrusion. Moderate central canal stenosis with mild deformity and flattening of the ventral surface of the cord to the left of midline and mild effacement of the left C6 nerve root sleeve centrally. Facet joints negative. No significant foraminal stenosis. C6-C7: Mild disc space loss. Interbody spurring and spondylotic  ridging. Annular bulge. There is a superimposed small left posterolateral disc protrusion. Facet joints negative. Moderate central canal stenosis. There is effacement of the left C7 nerve root sleeve centrally. Mild bilateral foraminal stenosis. C7-T1: Normal disc height. Facet joints negative. No significant central canal or neural foraminal stenosis. Craniocervical junction negative. No cord signal abnormality. Slight reversal of the usual cervical lordosis.. No significant marrow signal abnormality. No significant paravertebral soft tissue abnormality.   CONCLUSION:  1.  Degenerative changes and spondylosis in the cervical spine as described above.   2.  At C6-C7, there is moderate central canal stenosis. There is effacement of the left C7 nerve root sleeve centrally secondary to disc osteophyte complex.   3.  At C5-C6, there is moderate central canal stenosis with flattening of the ventral surface of the cord to the left of midline, and mild effacement of the left C6 nerve root sleeve centrally, secondary to disc osteophyte complex.   4.  At C4-C5, there is mild central canal stenosis.   5.  At C3-C4, there is moderate central canal stenosis with flattening of the ventral surface of the cord to the left of midline, secondary to disc osteophyte complex.   6.  No high-grade foraminal stenosis.

## 2021-06-12 NOTE — PROGRESS NOTES
[facility] Daily Progress     Patient Name: Lorraine Garza  Date: 2017  Visit #: 9  PTA # 1  Referral Diagnosis:   Radiculitis of left cervical region [M54.12]  - Primary       Cervical disc herniation [M50.20]       Cervical stenosis of spinal canal [M48.02]         Referring provider: Kisha Hernandez MD  Visit Diagnosis:     ICD-10-CM    1. Cervical radiculopathy M54.12    2. Poor posture R29.3    3. Stiffness of cervical spine M43.6          Assessment:     HEP/POC compliance is  good .  Patient demonstrates understanding/independence with home program.  Response to Intervention Pain during the day has decreased slightly, however, pt is experiencing more pain at night still. She has been able to complete more of her HEP after last weeks flare up. Is making functional improvements with sitting at work. However, symptoms in her L elbow are still present with mild improvements with nerve glides and MT.  Patient is benefitting from skilled physical therapy and is making steady progress toward functional goals.  Patient is appropriate to continue with skilled physical therapy intervention, as indicated by initial plan of care.    Goal Status:   Pt. will be independent with home exercise program in : 4 weeks (to self-manage pain and improve function) MET  Pt. will have improved quality of sleep: waking less times/night;in 12 weeks: Progressing towards- 8/28- increase in waking up at night again  Patient will sit: 60 minutes;for work;with less pain;in 12 weeks (with less than 3/10 pain)Progressing towards- 8/28 up/down throughout the day but sitting for about 1 hour pretty easily        Plan / Patient Education:     Continue with initial plan of care.  Progress with home program as tolerated. Continue with cervical mobilizations, cervical traction, progress scapular strengthening, prayer stretch    Subjective:     Pain Ratin, L elbow to wrist and proximally into tricep   Pain during the day  is tolerable (3-4/10) but at night is 7-8/10. Prevents her from falling asleep and sleeping at night. Has been waking up a few times the last few nights.    Objective:     Cervical AROM:   Flexion: WNL  Ext: mild   Lateral flexion: L WNL, R WNL  Rotation: R 72 deg  L 70 deg    Mild improvements in L wrist pain with nerve glides during traction    Treatment Today     TREATMENT MINUTES COMMENTS   Evaluation     Self-care/ Home management     Manual therapy 8 -prone STM to L thoracic paraspinals, L levator scapulae and L upper trapezius  -central and unilateral PAs from C1-T6 with tenderness throughout thoracic spine, grade III-IV, 6x10 sec oscillations   Neuromuscular Re-education     Therapeutic Activity     Therapeutic Exercises 8 -UBE x3 min F/B, WL2.0  -see exercise flow sheet   Gait training     Modality__traction________________ 18+2 set up -Pagan cervical traction unit with 20-23# of pull in slight flexion; reviewed indications and precautions; intermittent L median nerve glides throughout              Total 36    Blank areas are intentional and mean the treatment did not include these items.       Mary Boudreaux, PT, DPT  8/28/2017

## 2021-06-12 NOTE — PROGRESS NOTES
[facility] Daily Progress     Patient Name: Lorraine Garza  Date: 2017  Visit #: 10  PTA # 2  Referral Diagnosis:   Radiculitis of left cervical region [M54.12]  - Primary       Cervical disc herniation [M50.20]       Cervical stenosis of spinal canal [M48.02]         Referring provider: Kisha Hernandez MD  Visit Diagnosis:     ICD-10-CM    1. Cervical radiculopathy M54.12    2. Poor posture R29.3    3. Stiffness of cervical spine M43.6          Assessment:     HEP/POC compliance is  good .  Patient demonstrates understanding/independence with home program.  Response to Intervention Pain during the day has decreased slightly, however, pt is experiencing more pain at night still. She has been able to complete more of her HEP after last weeks flare up. Is making functional improvements with sitting at work. However, symptoms in her L elbow are still present with mild improvements with nerve glides and MT.  Patient is benefitting from skilled physical therapy and is making steady progress toward functional goals.  Patient is appropriate to continue with skilled physical therapy intervention, as indicated by initial plan of care.    Goal Status:   Pt. will be independent with home exercise program in : 4 weeks (to self-manage pain and improve function) MET  Pt. will have improved quality of sleep: waking less times/night;in 12 weeks: Progressing towards  Patient will sit: 60 minutes;for work;with less pain;in 12 weeks (with less than 3/10 pain)Progressing towards        Plan / Patient Education:     Continue with initial plan of care.  Progress with home program as tolerated. Continue with cervical mobilizations, cervical traction, progress scapular strengthening, prayer stretch    Subjective:   Pt is feeling better today.  Pt is to have an injection this morning at the spine center.  Pain Ratin L elbow to wrist and proximally into tricep.  Pt reports compliancy with HEP. She is tolerating  the exercises better.  Pt now has her home traction unit. She used it last evening.    Objective:     Cervical AROM:   Flexion: WNL  Ext: mild   Lateral flexion: L WNL, R WNL  Rotation: R 72 deg  L 70 deg    Mild improvements in L wrist pain with nerve glides during traction    Treatment Today     TREATMENT MINUTES COMMENTS   Evaluation     Self-care/ Home management     Manual therapy 10 -prone STM to L thoracic paraspinals, L levator scapulae and L upper trapezius     Neuromuscular Re-education     Therapeutic Activity     Therapeutic Exercises 6 -UBE x4 min F/B, WL2.0  -see exercise flow sheet  Added to HEP: B shoulder ER, prayer stretch (had been doing this at home)   Gait training     Modality__traction________________ 18+2 set up -Pagan cervical traction unit with 20-23# of pull in slight flexion; reviewed indications and precautions; intermittent L median nerve glides throughout              Total 36    Blank areas are intentional and mean the treatment did not include these items.       Nguyen Self, PTA,CLT  9/1/2017

## 2021-06-12 NOTE — PROGRESS NOTES
Assessment/Plan:     1. Cervical radiculopathy  MR Cervical Spine Without Contrast   2. Left arm pain         Diagnoses and all orders for this visit:    Cervical radiculopathy  -     MR Cervical Spine Without Contrast; Future; Expected date: 7/24/17  -Symptoms have been going on now for over 6 weeks.  Had some response to Medrol Dosepak and cyclobenzaprine.  At this point I feel that further evaluation with MRI would be indicated.  Order is placed.  Will follow up with her once results are available.  Discussed likely referral to spine center as next step but will await results of MRI.  In the meantime, she may continue cyclobenzaprine as needed.  Refill provided.  Again counseled on use of medication and side effects.  She may also use NSAIDs now that she is no longer on the Medrol Dosepak.    Left arm pain    Other orders  -     cyclobenzaprine (FLEXERIL) 5 MG tablet; Take 1 tablet (5 mg total) by mouth 3 (three) times a day as needed for muscle spasms.  Dispense: 30 tablet; Refill: 1             Subjective:      Lorraine Knapp is a 41 y.o. female who comes in today to follow-up on left upper back and shoulder pain.  She was seen in clinic by myself on July 10.  We treated with a Medrol Dosepak and provided prescription for cyclobenzaprine.  She felt that symptoms improved with Medrol Dosepak and that the cyclobenzaprine was helpful for relieving discomfort.  She ran out of the medication a couple of days ago and noticed the pain returning.  She is starting to feel the pain throughout her entire left arm, particularly around the elbow and into the forearm and wrist.  She still does not have any pain in her neck but starts to notice a discomfort around her shoulder blade.  She continues to need to sleep in the upright position.  Feels better to have a pillow or something supportive under the left upper back and shoulder area.  Continues to experience numbness in the left index finger pretty constantly.   Some occasional tingling in the left middle finger as well.  She does feel that the left arm is getting a little bit weak because she is not using it as much as she previously did.  She has no other concerns or questions.  We reviewed allergies and medications.  Remainder of review of systems is negative.  No other concerns today.    Current Outpatient Prescriptions   Medication Sig Dispense Refill     cetirizine (ZYRTEC) 10 mg cap Take 10 mg by mouth daily. 30 capsule 0     levonorgestrel-ethinyl estradiol (AVIANE,ALESSE,LESSINA) 0.1-20 mg-mcg per tablet Take 1 tablet by mouth daily. 90 tablet 1     multivitamin therapeutic (THERAGRAN) tablet Take 1 tablet by mouth daily.       cyclobenzaprine (FLEXERIL) 5 MG tablet Take 1 tablet (5 mg total) by mouth 3 (three) times a day as needed for muscle spasms. 30 tablet 1     traMADol (ULTRAM) 50 mg tablet Take 1 tablet (50 mg total) by mouth every 6 (six) hours. 15 tablet 0     No current facility-administered medications for this visit.        Past Medical History, Family History, and Social History reviewed.  Past Medical History:   Diagnosis Date     Seasonal allergies      TMJ (temporomandibular joint disorder)      Past Surgical History:   Procedure Laterality Date      SECTION      and 2008     TEMPOROMANDIBULAR JOINT SURGERY Bilateral 1994     Review of patient's allergies indicates no known allergies.  Family History   Problem Relation Age of Onset     Diabetes Mother      Hyperlipidemia Mother      Hypertension Mother      No Medical Problems Son      Diabetes Maternal Uncle      Hyperlipidemia Maternal Grandmother      Diabetes Maternal Grandfather      No Medical Problems Son      Prostate cancer Maternal Uncle      Social History     Social History     Marital status:      Spouse name: N/A     Number of children: N/A     Years of education: N/A     Occupational History     Not on file.     Social History Main Topics     Smoking status: Never  Smoker     Smokeless tobacco: Never Used     Alcohol use No     Drug use: No     Sexual activity: Yes     Partners: Male     Birth control/ protection: Pill, Surgical     Other Topics Concern     Not on file     Social History Narrative         Review of systems is as stated in HPI, and the remainder of the 10 system review is otherwise negative.    Objective:     Vitals:    07/24/17 1514   BP: 118/76   Patient Site: Right Arm   Patient Position: Sitting   Cuff Size: Adult Large   Pulse: (!) 114   Temp: 97.7  F (36.5  C)   TempSrc: Tympanic   SpO2: 98%   Weight: (!) 243 lb 2 oz (110.3 kg)    Body mass index is 41.09 kg/(m^2).    General appearance: alert, appears stated age and cooperative  Neurologic: Grossly normal  Musculoskeletal: No tenderness over cervical spine.  Some mild tenderness with palpation of left trapezius muscle.  Mild tenderness with palpation of left acromioclavicular joint.  She has full range of motion in the left shoulder but does have some increased discomfort with raising her arm above the level of her shoulder as well as with extremes of internal range of motion.  Positive Spurling's test.  5 out of 5 strength in bilateral upper extremities.   strength is equal.  Normal reflexes in bilateral upper extremities        This note has been dictated using voice recognition software. Any grammatical or context distortions are unintentional and inherent to the the software.

## 2021-06-12 NOTE — PROGRESS NOTES
F/U, doing better  --C/O left scapula which is tolerable   --C/O severe pain in the left shoulder, radiates down the to the elbow and left wrist pain  --Constant numbness in the left index finger, sometimes in the middle finger   --Rates pain 2/10  --PT x 9 sessions [facility], 8/28/17 for neck    Medication  --Flexeril 5 mg 1 tab TID PRN  --Gabapentin 100 mg (3-2-3), doesn't help at bedtime dose per pt  --Naproxen 500 mg 1 tab BID  --Ran out of Vicodin last week, want refill

## 2021-06-12 NOTE — PROGRESS NOTES
"[facility] Daily Progress     Patient Name: Lorraine Garza  Date: 2017  Visit #: 7  PTA # 1  Referral Diagnosis:   Radiculitis of left cervical region [M54.12]  - Primary       Cervical disc herniation [M50.20]       Cervical stenosis of spinal canal [M48.02]         Referring provider: Marlin Polo C*  Visit Diagnosis:     ICD-10-CM    1. Cervical radiculopathy M54.12    2. Poor posture R29.3    3. Stiffness of cervical spine M43.6          Assessment:     HEP/POC compliance is  good .  Patient demonstrates understanding/independence with home program.  Response to Intervention Educated on how to decrease pain after recent flare up. Continues to report relief with cervical traction.  Patient is benefitting from skilled physical therapy and is making steady progress toward functional goals.  Patient is appropriate to continue with skilled physical therapy intervention, as indicated by initial plan of care.    Goal Status:   Pt. will be independent with home exercise program in : 4 weeks (to self-manage pain and improve function) MET  Pt. will have improved quality of sleep: waking less times/night;in 12 weeks: Progressing towards-  woke up one night otherwise was doing really well  Patient will sit: 60 minutes;for work;with less pain;in 12 weeks (with less than 3/10 pain)Progressing towards        Plan / Patient Education:     Continue with initial plan of care.  Progress with home program as tolerated. Trial cervical mobilizations   Continue with cervical traction, progress scapular strengthening    Subjective:     Pain Ratin, L elbow, neck with intermittent flare ups to 5-6/10  Was doing pretty good- very mild discomfort. Yesterday was doing light yard work and has had increased pain since.    Objective:     Cervical AROM:   Flexion: WNL  Ext: mild   Lateral flexion: L WNL with \"twinge\", R WNL  Rotation: R 72 deg  L 70 deg    Pt reports slight reduction in pain at end of " session      Treatment Today     TREATMENT MINUTES COMMENTS   Evaluation     Self-care/ Home management     Manual therapy     Neuromuscular Re-education     Therapeutic Activity     Therapeutic Exercises 8 -UBE x4 min F/B, WL2.0  -see exercise flow sheet- educated on increased icing, nerve glides and seated posture to help reduce pain   Gait training     Modality__traction________________ 18+2 set up -Pagan cervical traction unit with 13-18# of pull in slight flexion; reviewed indications and precautions              Total 28    Blank areas are intentional and mean the treatment did not include these items.       Mary Boudreaux, PT, DPT  8/21/2017

## 2021-06-12 NOTE — PROGRESS NOTES
New patient evaluation  --Referred by Dr. Hernandez  --To review MRI cervical spine 7/25/17  --C/O left shoulder, left upper thoracic and left arm pain to the wrist x 1 month  --Numbness/tingling in the left pointer finger and sometimes the left middle finger x 1 month  --Now having more soreness in the left hand per pt  --Rates pain 4/10  --No hx of neck surgery, injection or PT  --Generally sees a chiro once every couple of months PRN for pain  --Want to discuss about workability    Medication  --Ibuprofen 600 mg 1 tab Q6H PRN  --Vicodin 5-325 mg 1 tab QHS PRN, Rx on 7/27/17  --Flexeril 5 mg 1 tab TID PRN  --Tried and failed Tramadol 50 mg, no relief per pt

## 2021-06-12 NOTE — PROGRESS NOTES
[facility] Daily Progress     Patient Name: Lorraine Garza  Date: 2017  Visit #: 3  PTA visit #:  na  Referral Diagnosis:   Radiculitis of left cervical region [M54.12]  - Primary       Cervical disc herniation [M50.20]       Cervical stenosis of spinal canal [M48.02]         Referring provider: Marlin Polo C*  Visit Diagnosis:     ICD-10-CM    1. Cervical radiculopathy M54.12    2. Poor posture R29.3    3. Stiffness of cervical spine M43.6          Assessment:     HEP/POC compliance is  good .  Patient demonstrates understanding/independence with home program.  Response to Intervention Continued relief with cervical traction and modifed/progressed HEP to decrease pain.  Patient is benefitting from skilled physical therapy and is making steady progress toward functional goals.  Patient is appropriate to continue with skilled physical therapy intervention, as indicated by initial plan of care.    Goal Status: on-going  Pt. will be independent with home exercise program in : 4 weeks (to self-manage pain and improve function)  Pt. will have improved quality of sleep: waking less times/night;in 12 weeks  Patient will sit: 60 minutes;for work;with less pain;in 12 weeks (with less than 3/10 pain)  No Data Recorded    Plan / Patient Education:     Continue with initial plan of care.  Progress with home program as tolerated. continue with cervical traction, median nerve tensioners when able    Subjective:     Pain Ratin-5 L cervical spine    Has been in more pain over the weekend- went to a [employer] game and did not have good seats (no backs). Exercises are going well.      Objective:     Cervical AROM:  Flexion: mild  Ext: mild with pain on L  Lateral flexion: L mild, R WNL  Rotation: R mild  L mild with stiffness    Pt reported 2-3/10 pain at end of session    Treatment Today     TREATMENT MINUTES COMMENTS   Evaluation     Self-care/ Home management     Manual therapy      Neuromuscular Re-education     Therapeutic Activity     Therapeutic Exercises 10 -see exercise flow sheet  -UBE x4 min F/B, WL3.0   Gait training     Modality__traction________________ 15+2 set up -Ej cervical traction unit with 13-18# of pull in slight flexion; reviewed indications and precautions              Total 27    Blank areas are intentional and mean the treatment did not include these items.       Mary Boudreaux, PT, DPT  8/7/2017

## 2021-06-12 NOTE — PROGRESS NOTES
"[facility] Daily Progress     Patient Name: Lorraine Garza  Date: 2017  Visit #: 6  PTA # 1  Referral Diagnosis:   Radiculitis of left cervical region [M54.12]  - Primary       Cervical disc herniation [M50.20]       Cervical stenosis of spinal canal [M48.02]         Referring provider: Marlin Polo C*  Visit Diagnosis:     ICD-10-CM    1. Cervical radiculopathy M54.12    2. Poor posture R29.3    3. Stiffness of cervical spine M43.6          Assessment:     HEP/POC compliance is  good .  Patient demonstrates understanding/independence with home program.  Response to Intervention Significant increase in cervical AROM since initial evaluation without less pain.   Patient is benefitting from skilled physical therapy and is making steady progress toward functional goals.  Patient is appropriate to continue with skilled physical therapy intervention, as indicated by initial plan of care.    Goal Status:   Pt. will be independent with home exercise program in : 4 weeks (to self-manage pain and improve function)Progressing towards  Pt. will have improved quality of sleep: waking less times/night;in 12 weeks: Progressing towards  Patient will sit: 60 minutes;for work;with less pain;in 12 weeks (with less than 3/10 pain)Progressing towards        Plan / Patient Education:     Continue with initial plan of care.  Progress with home program as tolerated.    Continue with cervical traction, progress scapular strengthening    Subjective:     Pain Ratin, pt states that her L UE and neck are stiff at the end of her work day.  Pt reports she has not had a headache since her last visit  Pt  Reports the traction has been helping.  Pt states she is sleeping better.    Objective:     Cervical AROM: (17)  Flexion: WNL  Ext: mild with \"twinge\" on L  Lateral flexion: L WNL, R WNL  Rotation: R 72 deg  L 70 deg      Treatment Today     TREATMENT MINUTES COMMENTS   Evaluation     Self-care/ Home " management     Manual therapy     Neuromuscular Re-education     Therapeutic Activity     Therapeutic Exercises 10 -UBE x4 min F/B, WL3.0  -see exercise flow sheet   Gait training     Modality__traction________________ 20+2 set up -Pagan cervical traction unit with 13-17# of pull in slight flexion; reviewed indications and precautions              Total 32    Blank areas are intentional and mean the treatment did not include these items.       Nguyen Self, PTA,CLT  8/16/2017

## 2021-06-12 NOTE — PROGRESS NOTES
[facility] Daily Progress     Patient Name: Lorraine Garza  Date: 2017  Visit #: 8  PTA # 1  Referral Diagnosis:   Radiculitis of left cervical region [M54.12]  - Primary       Cervical disc herniation [M50.20]       Cervical stenosis of spinal canal [M48.02]         Referring provider: Marlin Polo C*  Visit Diagnosis:     ICD-10-CM    1. Cervical radiculopathy M54.12    2. Poor posture R29.3    3. Stiffness of cervical spine M43.6          Assessment:     HEP/POC compliance is  good .  Patient demonstrates understanding/independence with home program.  Response to Intervention Increased pain this entire last week. Pt reporting pain relief with MT and cervical traction to 3/10. Educated to reduce ROM/reps/sets throughout the day to help decrease pain as exercises continue to flare up symptoms.  Patient is benefitting from skilled physical therapy and is making steady progress toward functional goals.  Patient is appropriate to continue with skilled physical therapy intervention, as indicated by initial plan of care.    Goal Status:   Pt. will be independent with home exercise program in : 4 weeks (to self-manage pain and improve function) MET  Pt. will have improved quality of sleep: waking less times/night;in 12 weeks: Progressing towards-  woke up one night otherwise was doing really well  Patient will sit: 60 minutes;for work;with less pain;in 12 weeks (with less than 3/10 pain)Progressing towards        Plan / Patient Education:     Continue with initial plan of care.  Progress with home program as tolerated. Assess response to cervical mobilizations,   Continue with cervical traction, progress scapular strengthening    Subjective:     Pain Ratin, L elbow to wrist and proximally into tricep   Pt has been in more pain since Monday. She was unable to go work one day due to pain. High in the past few days 8/10- not quite as bad as when it originally started. Trying to do  "the nerve glides would \"flare up\" her pain after 5-10 reps. She has been icing and using heat as much as possible.    Objective:     Cervical AROM:   Flexion: WNL  Ext: mild   Lateral flexion: L WNL, R WNL  Rotation: R 72 deg  L 70 deg    Pt reports reduced pain to 3/10 at end of session.      Treatment Today     TREATMENT MINUTES COMMENTS   Evaluation     Self-care/ Home management     Manual therapy 8 -prone STM to L thoracic paraspinals, L levator scapulae and L upper trapezius  -central and unilateral PAs from C1-T6 with tenderness throughout thoracic spine, grade III-IV, 6x10 sec oscillations   Neuromuscular Re-education     Therapeutic Activity     Therapeutic Exercises 4 -UBE x3 min F/B, WL1.5  -see exercise flow sheet- reduce reps and ROM with nerve glides    Gait training     Modality__traction________________ 15+2 set up -Pagan cervical traction unit with 21# of pull in slight flexion; reviewed indications and precautions- educated on process for obtaining home unit              Total 29    Blank areas are intentional and mean the treatment did not include these items.       Mary Boudreaux, PT, DPT  8/25/2017  "

## 2021-06-12 NOTE — PROGRESS NOTES
[facility] Daily Progress     Patient Name: Lorraine Garza  Date: 9/13/2017  Visit #: 12  PTA # 2  Referral Diagnosis:   Radiculitis of left cervical region [M54.12]  - Primary       Cervical disc herniation [M50.20]       Cervical stenosis of spinal canal [M48.02]         Referring provider: Marlin Polo  Visit Diagnosis:     ICD-10-CM    1. Cervical radiculopathy M54.12    2. Poor posture R29.3    3. Stiffness of cervical spine M43.6          Assessment:     HEP/POC compliance is  good .  Patient demonstrates understanding/independence with home program.  Response to Intervention Slight increase in symptoms after yardwork over the weekend. Pt demonstrates weakness in UE, cervical and scapular strengthening noted with fatigue with lifting today.  Patient is benefitting from skilled physical therapy and is making steady progress toward functional goals.  Patient is appropriate to continue with skilled physical therapy intervention, as indicated by initial plan of care.    Goal Status: on-going  Pt. will be independent with home exercise program in : 4 weeks (to self-manage pain and improve function) MET  Pt. will have improved quality of sleep: waking less times/night;in 12 weeks: Progressing towards  Patient will sit: 60 minutes;for work;with less pain;in 12 weeks (with less than 3/10 pain)Progressing towards        Plan / Patient Education:     Continue with initial plan of care.  Progress with home program as tolerated. Continue with cervical and thoracic mobilizations, progress scapular strengthening    Communication with: Referral Source  Patient Related Instruction: Nature of Condition;Treatment plan and rationale;Self Care instruction;Posture;Expected outcome;Body mechanics;Next steps;Basis of treatment;Precautions  Times per Week: 1  Number of Weeks: 4-6  Number of Visits: 6  Therapeutic Exercise: ROM;Stretching;Strengthening  Neuromuscular Reeducation: kinesio  tape;posture;balance/proprioception;core;TNE  Manual Therapy: soft tissue mobilization;myofascial release;joint mobilization;muscle energy;strain counterstrain  Equipment: theraband;TENS unit      Subjective:     Pain Ratin-3 L elbow to wrist and proximally into tricep; into upper thoracic   Was doing yardwork on  and was flared up until today (up to 6-7/10 more in the elbow).       Objective:     Cervical AROM:   Flexion: WNL  Ext: WNL  Lateral flexion: L WNL, R WNL  Rotation: R 75 deg  L 75 deg    Decreased L scapular retraction during prone Is compared to R    Treatment Today     TREATMENT MINUTES COMMENTS   Evaluation     Self-care/ Home management     Manual therapy 15 -prone STM to joel thoracic paraspinals, joel levator scapulae and joel upper trapezius with tenderness on L  -supine L UE manual ulnar nerve glides with tension noted   Neuromuscular Re-education     Therapeutic Activity     Therapeutic Exercises 10 -UBE x4 min F/B, WL2.5  -see exercise flow sheet  -thoracic mobility over foam roller x2 min   Gait training     Modality_________________                Total 25    Blank areas are intentional and mean the treatment did not include these items.       Mary Boudreaux, PT, DPT  2017

## 2021-06-12 NOTE — PROGRESS NOTES
[facility] Daily Progress     Patient Name: Lorraine Garza  Date: 2017  Visit #: 4  PTA visit #:  na  Referral Diagnosis:   Radiculitis of left cervical region [M54.12]  - Primary       Cervical disc herniation [M50.20]       Cervical stenosis of spinal canal [M48.02]         Referring provider: Marlin Polo C*  Visit Diagnosis:     ICD-10-CM    1. Cervical radiculopathy M54.12    2. Poor posture R29.3    3. Stiffness of cervical spine M43.6          Assessment:     HEP/POC compliance is  good .  Patient demonstrates understanding/independence with home program.  Response to Intervention Pt reporting relief with cervical traction and tolerated progression of UE nerve glides.  Patient is benefitting from skilled physical therapy and is making steady progress toward functional goals.  Patient is appropriate to continue with skilled physical therapy intervention, as indicated by initial plan of care.    Goal Status: on-going  Pt. will be independent with home exercise program in : 4 weeks (to self-manage pain and improve function)  Pt. will have improved quality of sleep: waking less times/night;in 12 weeks  Patient will sit: 60 minutes;for work;with less pain;in 12 weeks (with less than 3/10 pain)  No Data Recorded    Plan / Patient Education:     Continue with initial plan of care.  Progress with home program as tolerated. continue with cervical traction, progress scapular strengthening    Subjective:     Pain Ratin L cervical spine    Felt really good the night after her last session. HA symptoms have improved. Intermittent pain in her elbow and wrist.   Sleeping has improved- she has increased the gabapentin at night. She will wake up around 4-4:30 am.    Objective:     Cervical AROM:  Flexion: mild  Ext: mild with pain on L  Lateral flexion: L mild, R WNL  Rotation: R mild  L mild with stiffness      Treatment Today     TREATMENT MINUTES COMMENTS   Evaluation     Self-care/  Home management     Manual therapy     Neuromuscular Re-education 8 -see exercise flow sheet   Therapeutic Activity     Therapeutic Exercises 4 -UBE x4 min F/B, WL3.0   Gait training     Modality__traction________________ 20+2 set up -[facility] cervical traction unit with 13-18# of pull in slight flexion; reviewed indications and precautions              Total 34    Blank areas are intentional and mean the treatment did not include these items.       Mary Boudreaux, PT, DPT  8/9/2017

## 2021-06-12 NOTE — PROGRESS NOTES
[facility] Daily Progress     Patient Name: Lorraine Garza  Date: 2017  Visit #: 2  PTA visit #:  na  Referral Diagnosis:   Radiculitis of left cervical region [M54.12]  - Primary       Cervical disc herniation [M50.20]       Cervical stenosis of spinal canal [M48.02]         Referring provider: Marlin Polo C*  Visit Diagnosis:     ICD-10-CM    1. Cervical radiculopathy M54.12    2. Poor posture R29.3    3. Stiffness of cervical spine M43.6          Assessment:     HEP/POC compliance is  good .  Response to Intervention Relief with cervical traction and HEP. Able to progress cervical strengthening with supine chin tuck today.  Patient is benefitting from skilled physical therapy and is making steady progress toward functional goals.  Patient is appropriate to continue with skilled physical therapy intervention, as indicated by initial plan of care.    Goal Status: on-going  Pt. will be independent with home exercise program in : 4 weeks (to self-manage pain and improve function)  Pt. will have improved quality of sleep: waking less times/night;in 12 weeks  Patient will sit: 60 minutes;for work;with less pain;in 12 weeks (with less than 3/10 pain)  No Data Recorded    Plan / Patient Education:     Continue with initial plan of care.  Progress with home program as tolerated. continue with cervical traction, rows, median nerve tensioners when able    Subjective:     Pain Ratin L cervical spine  After last appt was sore for a few hours and then later was pain-free. Yesterday and today with the cool weather- her pain has increased.      Objective:     Cervical AROM:  Flexion: mild  Ext: mild  Lateral flexion: L mild, R WNL  Rotation: R mild  L mild with stiffness    Treatment Today     TREATMENT MINUTES COMMENTS   Evaluation     Self-care/ Home management     Manual therapy     Neuromuscular Re-education     Therapeutic Activity     Therapeutic Exercises 8 -see exercise flow  sheet  -UBE x4 min F/B, WL2.0   Gait training     Modality__traction________________ 15+2 set up -[facility] cervical traction unit with 10-15# of pull in slight flexion; reviewed indications and precautions              Total 25    Blank areas are intentional and mean the treatment did not include these items.       Mary Boudreaux, PT, DPT  8/4/2017

## 2021-06-12 NOTE — PROGRESS NOTES
Assessment:     Diagnoses and all orders for this visit:    Radiculitis of left cervical region  -     OPS TFESI C/T Spine Unilateral; Future; Expected date: 8/30/17  -     HYDROcodone-acetaminophen 5-325 mg per tablet; Take 1 tablet by mouth daily as needed for pain (Severe pain).  Dispense: 14 tablet; Refill: 0    Cervical disc herniation  -     OPS TFESI C/T Spine Unilateral; Future; Expected date: 8/30/17  -     HYDROcodone-acetaminophen 5-325 mg per tablet; Take 1 tablet by mouth daily as needed for pain (Severe pain).  Dispense: 14 tablet; Refill: 0    Cervical stenosis of spinal canal       Lorraine Knapp is a 41 y.o. y.o. female with past medical history significant for overweight who presents today for follow-up regarding ongoing acute left cervical radiculitis C7 dermatomal pattern ongoing ×2 months with paresthesias with minimal relief with physical therapy/gabapentin.    Patient is strong on exam however she does have sensation deficit C7 dermatomal pattern.    Plan:     A shared decision making plan was used.  The patient's values and choices were respected. Prior medical records from 8/1/17 were reviewed today. The following represents what was discussed and decided upon by the provider and the patient.     -DIAGNOSTIC TESTS: Images were personally reviewed and interpreted.   --Reviewed cervical spine MRI today which reveals C6-7 superimposed small left posterior lateral disc protrusion with moderate central canal stenosis and effacement of the left C7 nerve root with mild bilateral foraminal stenosis.  There is also a C5-6 shallow left paracentral disc protrusion that looks less pronounced also contributing to moderate central canal stenosis with left C6 nerve root compression however no foraminal stenosis noted.  Otherwise degenerative spondylosis noted.     -INTERVENTIONS: Ordered a left C6-7 transforaminal epidural steroid injection to see if we get further relief of her left cervical  radiculitis.  There is left posterior lateral disc protrusion contacting left C7 nerve root.    -MEDICATIONS: Encouraged patient to increase gabapentin at nighttime to 4 tablets, if that is not helping she can increase to 5 tablets at nighttime.  Continue 3 tablets a.m. and 2 tablets in the afternoon.  -Continue naproxen 500 mg 1 tablet twice daily as needed.  -Did refill hydrocodone 5/325 mg 1 tablet daily as needed for severe pain, #14 tablets given for 2 weeks worth.  It is not something I will give ongoing, and we are hopeful that she will not needed further after injection. MN  checked. Discussed the risks (eg, addiction, overdose, worsening pain) verses benefit of opioid use with patient today. Explained that this medication will not be a long term solution to ongoing pain. Discussed using lowest effective dose and the importance of other measures for pain management including PT, other non-opioid medications, behavioral treatments, and other procedure options.   Discussed side effects of medications and proper use. Patient verbalized understanding.    -PHYSICAL THERAPY: Encouraged patient to continue home exercise program as well as PT sessions at this time as well as tolerated.  Discussed the importance of core strengthening, ROM, stretching exercises with the patient and how each of these entities is important in decreasing pain.  Explained to the patient that the purpose of physical therapy is to teach the patient a home exercise program.  These exercises need to be performed every day in order to decrease pain and prevent future occurrences of pain.        -PATIENT EDUCATION: 25 minutes of total visit time was spent face to face with the patient today, 60 % of the visit was spent on counseling, education, and coordinating care.   -5 minutes spent outside of visit time, non-face-to-face time, reviewing chart.    -FOLLOW UP: Follow-up for injection with Dr. Diaz than 2 weeks postinjection.  Advised to  contact clinic if symptoms worsen or change.    Subjective:     Lorraine Knapp is a 41 y.o. female who presents today for follow-up regarding ongoing neck pain on the left that radiates to the left lateral and periscapular shoulder, triceps, forearm wrist and into the index and pointer finger in the left with constant numbness and tingling on the index finger ongoing ×2 months.  Currently her pain is a 2/10 but it still does get up to 10/10 at its worst more bothersome at nighttime.  She does report that she had a flareup last week however with physical therapy manipulation and traction that did improve back to baseline.  Patient denies balance changes, denies recent trips or falls, denies bowel or bladder dysfunction, denies difficulties with fine motor skills, denies weakness. No myelopathic symptoms.     Treatment to Date: No prior chiropractic care.  No prior spinal surgery.  Physical therapy ×9 sessions [facility] last 8/28/2017 with some relief.  She is diligent about doing HEP.     Medications:  Tramadol with no relief.  Vicodin at nighttime with some relief.    Ibuprofen 600 mg 3-4 times daily with some relief.  Naproxen 500 mg 1 tablet twice daily with better relief than ibuprofen.  Gabapentin 100 mg 3-2-3 with some relief, however minimal relief at nighttime as her pain is worse.  Unable to take 3 in the afternoon due to worry for excessive sleepiness.    Current Outpatient Prescriptions on File Prior to Encounter   Medication Sig Dispense Refill     cetirizine (ZYRTEC) 10 mg cap Take 10 mg by mouth daily. 30 capsule 0     cyclobenzaprine (FLEXERIL) 5 MG tablet Take 1 tablet (5 mg total) by mouth 3 (three) times a day as needed for muscle spasms. 30 tablet 1     gabapentin (NEURONTIN) 100 MG capsule Take 300 mg by mouth 3 (three) times a day. Follow Gabapentin Dosing chart given 270 capsule 3     levonorgestrel-ethinyl estradiol (AVIANE,ALESSE,LESSINA) 0.1-20 mg-mcg per tablet Take 1 tablet by mouth  daily. 90 tablet 1     multivitamin therapeutic (THERAGRAN) tablet Take 1 tablet by mouth daily.       ibuprofen (ADVIL,MOTRIN) 600 MG tablet Take 600 mg by mouth every 6 (six) hours as needed for pain.       No current facility-administered medications on file prior to encounter.        No Known Allergies    Past Medical History:   Diagnosis Date     Seasonal allergies      TMJ (temporomandibular joint disorder)         Review of Systems  ROS: Positive for numbness and tingling.  Specifically negative for bowel/bladder dysfunction, balance changes, headache, dizziness, foot drop, fevers, chills, appetite changes, nausea/vomiting, unexplained weight loss. Otherwise 13 systems reviewed are negative. Please see the patient's intake questionnaire from today for details.    Reviewed Social, Family, Past Medical and Past Surgical history with patient, no significant changes noted since prior visit.     Objective:     BP (!) 174/95 (Patient Site: Left Arm, Patient Position: Sitting)  Wt (!) 247 lb (112 kg)  LMP 08/01/2017  BMI 41.74 kg/m2    PHYSICAL EXAMINATION:   --CONSTITUTIONAL: Vital signs as above. No acute distress. The patient is well nourished and well groomed.  --PSYCHIATRIC:  The patient is awake, alert, oriented to person, place, time and answering questions appropriately with clear speech. Appropriate mood and affect   --HEENT: Sclera are non-injected. Extraocular muscles are intact.   --SKIN: Skin over the face, bilateral lower extremities, and posterior torso is clean, dry, intact without rashes.  --RESPIRATORY: Normal rhythm and effort. No abnormal accessory muscle breathing patterns noted.   --GROSS MOTOR: Easily arises from a seated position.   --CERVICAL SPINE: Inspection reveals no evidence of deformity. Range of motion is not limited in cervical flexion, extension, or lateral rotation. No tenderness to palpation.    --SHOULDERS: Full range of motion bilaterally. Negative empty can.  --UPPER  EXTREMITY MOTOR TESTING:  Wrist flexion left 5/5, right 5/5  Wrist extension left 5/5, right 5/5  Pronators left 5/5, right 5/5  Biceps left 5/5, right 5/5   Triceps left 5/5, right 5/5   Shoulder abduction left 5/5, right 5/5   left 5/5, right 5/5  --NEUROLOGIC: CN III-XII are grossly intact. 2/4 symmetric biceps, brachioradialis, triceps reflexes bilaterally. Sensation to right upper extremity intact, diminished on the left into the pointer finger only. Negative Squires's bilaterally.   --VASCULAR: Warm upper limbs bilaterally.     Imaging of the cervical spine: MRI of the cervical spine was reviewed today. The images were shown to the patient and the findings were explained using a spine model.      Xr Cervical Spine 2 - 3 Vws  Xr Thoracic Spine 2 Vws  Result Date: 7/11/2017  XR CERVICAL SPINE 2 - 3 VWS, XR THORACIC SPINE 2 VWS 7/10/2017 2:55 PM INDICATION: upper back pain radiating to left arm COMPARISON: None. FINDINGS: Cervical spine: The cervical spinal is visualized to the C7 vertebral body on the lateral view. There is mild retrolisthesis of C3 on C4, mild anterolisthesis of C4 on C5. There is no acute fracture. There is mild loss of disc height space at C5-C6 with endplate osteophyte formation. No prevertebral soft tissue swelling. No nodules in the visualized lung apices. Open-mouth odontoid view is suboptimal secondary to overlapping structures. The spinous process of C2 projects over the space between the right lateral mass of C1 and the dens, possibly on the basis of patient rotation. Mandibular fixation hardware is noted. Thoracic spine: The cervicothoracic junction is not well evaluated secondary to overlapping structures. Within this constraint, there is mild dextroconvex curvature of the midthoracic spine and levoconvex curvature of the lower thoracic spine. There is no acute fracture  in the visualized portions. The visualized lung parenchyma is well aerated.         Mr Cervical Spine Without  Contrast  Result Date: 7/26/2017  INDICATION: Neck pain extending into left shoulder and arm. Symptoms five weeks. TECHNIQUE: Unenhanced. COMPARISON: None. FINDINGS: C2-C3: Normal disc height. Mild degenerative facet arthropathy.. No significant central canal or neural foraminal stenosis. C3-C4: Normal disc height. Mild posterolateral spondylotic ridging on the left. Annular bulge. Small shallow left paracentral disc protrusion. Mild degenerative facet arthropathy. Mild to moderate central canal stenosis, there is mild deformity and flattening of the ventral surface of the cervical cord to the left of midline. No significant foraminal stenosis. C4-C5: Normal disc height. Slight posterolateral spondylotic ridging eccentric right. There is likely a small right posterolateral disc protrusion. Facet joints negative. Mild central canal stenosis. Mild right foraminal stenosis. C5-C6: Mild disc space loss. Interbody spurring and spondylotic ridging. Annular bulge and likely a shallow left paracentral disc protrusion. Moderate central canal stenosis with mild deformity and flattening of the ventral surface of the cord to the left of midline and mild effacement of the left C6 nerve root sleeve centrally. Facet joints negative. No significant foraminal stenosis. C6-C7: Mild disc space loss. Interbody spurring and spondylotic ridging. Annular bulge. There is a superimposed small left posterolateral disc protrusion. Facet joints negative. Moderate central canal stenosis. There is effacement of the left C7 nerve root sleeve centrally. Mild bilateral foraminal stenosis. C7-T1: Normal disc height. Facet joints negative. No significant central canal or neural foraminal stenosis. Craniocervical junction negative. No cord signal abnormality. Slight reversal of the usual cervical lordosis.. No significant marrow signal abnormality. No significant paravertebral soft tissue abnormality.   CONCLUSION:  1.  Degenerative changes and  spondylosis in the cervical spine as described above.   2.  At C6-C7, there is moderate central canal stenosis. There is effacement of the left C7 nerve root sleeve centrally secondary to disc osteophyte complex.   3.  At C5-C6, there is moderate central canal stenosis with flattening of the ventral surface of the cord to the left of midline, and mild effacement of the left C6 nerve root sleeve centrally, secondary to disc osteophyte complex.   4.  At C4-C5, there is mild central canal stenosis.   5.  At C3-C4, there is moderate central canal stenosis with flattening of the ventral surface of the cord to the left of midline, secondary to disc osteophyte complex.   6.  No high-grade foraminal stenosis.

## 2021-06-12 NOTE — PROGRESS NOTES
"[facility] Daily Progress     Patient Name: Lorraine Garza  Date: 2017  Visit #: 5  PTA visit #:  na  Referral Diagnosis:   Radiculitis of left cervical region [M54.12]  - Primary       Cervical disc herniation [M50.20]       Cervical stenosis of spinal canal [M48.02]         Referring provider: Marlin Polo C*  Visit Diagnosis:     ICD-10-CM    1. Cervical radiculopathy M54.12    2. Poor posture R29.3    3. Stiffness of cervical spine M43.6          Assessment:     HEP/POC compliance is  good .  Patient demonstrates understanding/independence with home program.  Response to Intervention Significant increase in cervical AROM since initial evaluation without less pain.   Patient is benefitting from skilled physical therapy and is making steady progress toward functional goals.  Patient is appropriate to continue with skilled physical therapy intervention, as indicated by initial plan of care.    Goal Status: on-going  Pt. will be independent with home exercise program in : 4 weeks (to self-manage pain and improve function)  Pt. will have improved quality of sleep: waking less times/night;in 12 weeks  Patient will sit: 60 minutes;for work;with less pain;in 12 weeks (with less than 3/10 pain)  No Data Recorded    Plan / Patient Education:     Continue with initial plan of care.  Progress with home program as tolerated. continue with cervical traction, progress scapular strengthening    Subjective:     Pain Ratin-3 L cervical spine- \"stiff/achy\"  Pt had a pretty bad HA yesterday- so she was in bed most of the day. Neck is more sore/stiff today as a result. Usually feels pretty good up until Saturday/ from the PT sessions. New nerve glides are going well.    Objective:     Cervical AROM:  Flexion: mild  Ext: mild with \"twinge\" on L  Lateral flexion: L WNL, R WNL  Rotation: R 70 deg  L 68 deg      Treatment Today     TREATMENT MINUTES COMMENTS   Evaluation     Self-care/ Home " management     Manual therapy 5 -prone central PAs to T4-T8, grade III-IV, x30 sec oscillations   Neuromuscular Re-education     Therapeutic Activity     Therapeutic Exercises 10 -UBE x4 min F/B, WL3.0  -see exercise flow sheet   Gait training     Modality__traction________________ 20+2 set up -[facility] cervical traction unit with 13-17# of pull in slight flexion; reviewed indications and precautions              Total 37    Blank areas are intentional and mean the treatment did not include these items.       Mary Boudreaux, PT, DPT  8/14/2017
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
## 2021-06-12 NOTE — PROGRESS NOTES
[facility] Daily Progress     Patient Name: Lorraine Garza  Date: 2017  Visit #: 11  PTA # 2  Referral Diagnosis:   Radiculitis of left cervical region [M54.12]  - Primary       Cervical disc herniation [M50.20]       Cervical stenosis of spinal canal [M48.02]         Referring provider: Kisha Hernandez MD  Visit Diagnosis:     ICD-10-CM    1. Cervical radiculopathy M54.12    2. Poor posture R29.3    3. Stiffness of cervical spine M43.6          Assessment:     HEP/POC compliance is  good .  Patient demonstrates understanding/independence with home program.  Response to Intervention Improved cervical AROM and decreased pain with MT. Noted weakness on L scapular stabilizers compared to R with prone I exercise.  Patient is benefitting from skilled physical therapy and is making steady progress toward functional goals.  Patient is appropriate to continue with skilled physical therapy intervention, as indicated by initial plan of care.    Goal Status:   Pt. will be independent with home exercise program in : 4 weeks (to self-manage pain and improve function) MET  Pt. will have improved quality of sleep: waking less times/night;in 12 weeks: Progressing towards  Patient will sit: 60 minutes;for work;with less pain;in 12 weeks (with less than 3/10 pain)Progressing towards        Plan / Patient Education:     Continue with initial plan of care.  Progress with home program as tolerated. Continue with cervical and thoracic mobilizations, progress scapular strengthening    Subjective:     Pain Ratin-3 L elbow to wrist and proximally into tricep; into upper thoracic   Felt really good after the injection and maybe overdid it on Saturday cleaning. Exercises are going well and took a break from the home traction over the weekend. Sleeping is still uncomfortable.       Objective:     Cervical AROM:   Flexion: WNL  Ext: WNL  Lateral flexion: L WNL, R WNL  Rotation: R 75 deg  L 75 deg    Decreased L  scapular retraction during prone Is compared to R    Treatment Today     TREATMENT MINUTES COMMENTS   Evaluation     Self-care/ Home management     Manual therapy 20 -prone STM to joel thoracic paraspinals, joel levator scapulae and joel upper trapezius with tenderness on L  -prone, grade V, thoracic manipulation at T8, T6, T4 and T2 with cavitations noted     Neuromuscular Re-education     Therapeutic Activity     Therapeutic Exercises 8 -UBE x4 min F/B, WL2.5  -see exercise flow sheet  -thoracic mobility over foam roller x2 min   Gait training     Modality_________________                Total 28    Blank areas are intentional and mean the treatment did not include these items.       Mary Boudreaux, PT, DPT  9/8/2017

## 2021-06-13 NOTE — PROGRESS NOTES
[facility] Daily Progress     Patient Name: Lorraine Garza  Date: 2017  Visit #: 12+ 26 with updated order  PTA # 2- na  Referral Diagnosis:   Radiculitis of left cervical region [M54.12]  - Primary       Cervical disc herniation [M50.20]       Cervical stenosis of spinal canal [M48.02]         Referring provider: Marlin Polo  Visit Diagnosis:     ICD-10-CM    1. Cervical radiculopathy M54.12    2. Poor posture R29.3    3. Stiffness of cervical spine M43.6          Assessment:     HEP/POC compliance is  good .  Patient demonstrates understanding/independence with home program.  Response to Intervention Cervical AROM WNL in all planes today. Less neck and UE symptoms with pt tolerating supine chin tucks today.  Patient is benefitting from skilled physical therapy and is making steady progress toward functional goals.  Patient is appropriate to continue with skilled physical therapy intervention, as indicated by initial plan of care.    Goal Status: on-going  Pt. will be independent with home exercise program in : 4 weeks (to self-manage pain and improve function) MET  Pt. will have improved quality of sleep: waking less times/night;in 12 weeks: Met in this session, continue for consistency  Patient will sit: 60 minutes;for work;with less pain;in 12 weeks (with less than 3/10 pain) MET        Plan / Patient Education:     Continue with initial plan of care.  Progress with home program as tolerated. review HEP and likely discharge    Subjective:     Pain Ratin cervical spine, 1 L elbow to wrist and proximally into tricep   Was sore after last PT session. Has reduced her pain medication intake during the day. Arm pain has been very minimal. Driving has improved this week.    Objective:     Cervical AROM:   Flexion: WNL  Ext: WNL  Lateral flexion: L WNL, R WNL  Rotation: R WNL  L WNL    Less pain with STM today     Treatment Today     TREATMENT MINUTES COMMENTS   Evaluation    "  Self-care/ Home management     Manual therapy 15 -prone instrument assisted STM, grade IV for muscle relaxation to joel thoracic paraspinals, joel levator scapulae and joel upper trapezius with tenderness on L but no \"burning\" or increase in arm pain   Neuromuscular Re-education     Therapeutic Activity     Therapeutic Exercises 10 -UBE x4 min F/B, WL3.0  -see exercise flow sheet   Gait training     Modality_________________                Total 25    Blank areas are intentional and mean the treatment did not include these items.       Mary Boudreaux, PT, DPT  9/27/2017  "

## 2021-06-13 NOTE — PROGRESS NOTES
Post injection F/U  --9/1/17 left C6-C7 TFESI  --States of 80% relief x 3 days only  --C/O left posterior neck, left upper thoracic, left shoulder, the whole left arm to the wrist, no change  --Continue to have numbness in the left index and middle fingers, no change  --Rates pain 4/10  --PT x 12 sessions [facility] for neck, 9/13/17    Medication  --Flexeril 5 mg 1 tab TID PRN  --Naproxen 500 mg 1 tab BID PRN  --Gabapentin 100 mg (3-3-4) and (3-3-5) per Marlin but no relief with either increased dose at bedtime per pt

## 2021-06-13 NOTE — PROGRESS NOTES
Assessment:        1. Health care maintenance  Lipid Cascade FASTING    Glucose    Gynecologic Cytology (PAP Smear)   2. Need for immunization against influenza  Influenza, Seasonal,Quad Inj, 36+ MOS   3. Menorrhagia  Ambulatory referral to Obstetrics / Gynecology   4. Elevated blood pressure reading     5. Obesity         Plan:      1. Healthcare maintenance: Check fasting lipids, glucose today.  Pap smear performed.  Again discussed doing a mammogram since she has not had one before.  She was provided with a card and information on scheduling this.  Encouraged regular exercise, healthy diet and adequate calcium and vitamin D intake.  Encouraged weight loss efforts.  Influenza vaccine administered  2. Menorrhagia: Referral placed to obstetrics and gynecology for consideration of endometrial ablation.  In the meantime she will continue oral contraceptive pills.  Refill provided.  3. Elevated blood pressure reading: Recent blood pressure readings have been a little bit elevated.  This could be related to pain from her neck and cervical radiculopathy, weight gain from being less active as well as use of birth control pills.  She will see gynecologist to discuss endometrial ablation and then will get off birth control pills.  Encouraged her to check blood pressure periodically as an outpatient and notify me if persistently above 140/90.  In the meantime encourage intensive lifestyle modifications including dietary changes, increase physical activity and weight loss.  Recommend follow-up in 3 months.  If blood pressure is still elevated at that time, will need to initiate antihypertensive medication.  4. Obesity: Encouraged increased physical activity, healthy diet and weight loss.      The following high BMI interventions were performed this visit: encouragement to exercise    Subjective:      Lorraine Knapp is a 41 y.o. female who presents for an annual exam.  We reviewed her health history.  She continues to do  physical therapy for neck pain and cervical radiculopathy.  Reports that symptoms are slowly improving.  We reviewed medications and allergies.  She continues on naproxen, gabapentin and cyclobenzaprine for pain management.  She is on birth control pills for management of heavy menstrual periods.  She is thinking that she would like to get off of the birth control pills and consider an endometrial ablation as an alternative for management of her menorrhagia.  She has periodically tried to get off the birth control pills over the past year and a half but will experience heavy and painful menstrual periods.  She is also noticing some increased irritability and hot flashes around the time of her menstrual cycle.  She would like to continue the birth control pills until she is able to see a specialist to discuss alternative management of her menorrhagia.  She also has history of iron deficiency anemia related to the menorrhagia.  Blood pressure is noted to be elevated today.  She has had some elevated blood pressure readings recently when she has been in the spine center and since she has been dealing with her neck pain issues.  She has not been able to be as physically active and has gained about 8 pounds since July when her problems started.  She is not experiencing any headaches, vision changes or chest pains or pressures in the chest.  She does not check her blood pressure outside of the clinic.  We reviewed family and social history.  Review of systems is assessed and is otherwise negative.    Healthy Habits:   Regular Exercise: Yes  Sunscreen Use: Yes  Healthy Diet: Yes  Dental Visits Regularly: Yes  Seat Belt: Yes  Sexually active: Yes  Self Breast Exam Monthly:Yes  Hemoccults: N/A  Flex Sig: N/A  Colonoscopy: N/A  Lipid Profile: Yes  Glucose Screen: Yes  Prevention of Osteoporosis: Yes  Last Dexa: N/A  Guns at Home:  N/A      Immunization History   Administered Date(s) Administered     Tdap 07/23/2008      Immunization status: up to date and documented, due today.      Gynecologic History  Patient's last menstrual period was 10/05/2017.  Contraception: OCP (estrogen/progesterone)  Last Pap: . Results were: normal  Last mammogram: n/a. Results were: n/a      OB History    Para Term  AB Living   2 2 2   2   SAB TAB Ectopic Multiple Live Births             # Outcome Date GA Lbr Akhil/2nd Weight Sex Delivery Anes PTL Lv   2 Term            1 Term                   Current Outpatient Prescriptions   Medication Sig Dispense Refill     cetirizine (ZYRTEC) 10 mg cap Take 10 mg by mouth daily. 30 capsule 0     cyclobenzaprine (FLEXERIL) 5 MG tablet Take 1-2 tablets (5-10 mg total) by mouth 3 (three) times a day as needed for muscle spasms. 42 tablet 1     levonorgestrel-ethinyl estradiol (AVIANE,ALESSE,LESSINA) 0.1-20 mg-mcg per tablet Take 1 tablet by mouth daily. 90 tablet 3     multivitamin therapeutic (THERAGRAN) tablet Take 1 tablet by mouth daily.       naproxen (EC NAPROSYN) 500 MG EC tablet Take 1 tablet (500 mg total) by mouth 2 (two) times a day as needed. 42 tablet 1     gabapentin (NEURONTIN) 100 MG capsule Take 300 mg by mouth 3 (three) times a day. Follow Gabapentin Dosing chart given 270 capsule 3     ibuprofen (ADVIL,MOTRIN) 600 MG tablet Take 600 mg by mouth every 6 (six) hours as needed for pain.       No current facility-administered medications for this visit.      Past Medical History:   Diagnosis Date     Seasonal allergies      TMJ (temporomandibular joint disorder)      Past Surgical History:   Procedure Laterality Date      SECTION      and      TEMPOROMANDIBULAR JOINT SURGERY Bilateral 1994     Review of patient's allergies indicates no known allergies.  Family History   Problem Relation Age of Onset     Diabetes Mother      Hyperlipidemia Mother      Hypertension Mother      No Medical Problems Son      Diabetes Maternal Uncle      Hyperlipidemia Maternal  "Grandmother      Diabetes Maternal Grandfather      No Medical Problems Son      Prostate cancer Maternal Uncle      Social History     Social History     Marital status:      Spouse name: N/A     Number of children: N/A     Years of education: N/A     Occupational History     Not on file.     Social History Main Topics     Smoking status: Never Smoker     Smokeless tobacco: Never Used     Alcohol use No     Drug use: No     Sexual activity: Yes     Partners: Male     Birth control/ protection: Pill, Surgical      Comment: hus had vas     Other Topics Concern     Not on file     Social History Narrative       Review of Systems    See HPI      Objective:         BP (!) 156/92 (Patient Site: Right Arm, Patient Position: Sitting, Cuff Size: Adult Large)  Pulse (!) 102  Temp 98.4  F (36.9  C) (Oral)   Ht 5' 5\" (1.651 m)  Wt (!) 247 lb 1 oz (112.1 kg)  LMP 10/05/2017  SpO2 98%  Breastfeeding? No  BMI 41.11 kg/m2      Physical Exam:  General Appearance: Alert, cooperative, no distress, appears stated age  Head: Normocephalic, without obvious abnormality, atraumatic  Eyes: PERRL, conjunctiva/corneas clear, EOM's intact  Ears: Normal TM's and external ear canals, both ears  Nose: Nares normal, septum midline,mucosa normal, no drainage  Throat: Lips, mucosa, and tongue normal; teeth and gums normal  Neck: Supple, symmetrical, trachea midline, no adenopathy;  thyroid: not enlarged, symmetric, no tenderness/mass/nodules;   Back: Symmetric, no curvature, ROM normal  Lungs: Clear to auscultation bilaterally, respirations unlabored  Breasts: No breast masses, tenderness, asymmetry, or nipple discharge.  Heart: Regular rate and rhythm, S1 and S2 normal, no murmur, rub, or gallop, Abdomen: Soft, non-tender, bowel sounds active all four quadrants,  no masses, no organomegaly  Pelvic:Normally developed genitalia with no external lesions or eruptions. Vagina and cervix show no lesions, inflammation, discharge or " tenderness. No cystocele, No rectocele. Uterus normal.  No adnexal mass or tenderness.  Extremities: Extremities normal, atraumatic, no cyanosis or edema  Skin: Skin color, texture, turgor normal, no rashes or lesions  Lymph nodes: Cervical, supraclavicular, and axillary nodes normal  Neurologic: Normal

## 2021-06-13 NOTE — PROGRESS NOTES
"[facility] Daily Progress     Patient Name: Lorraine Garza  Date: 10/25/2017  Visit #: 12+  with updated order  PTA # 2- na  Referral Diagnosis:   Radiculitis of left cervical region [M54.12]  - Primary       Cervical disc herniation [M50.20]       Cervical stenosis of spinal canal [M48.02]         Referring provider: Marlin Polo  Visit Diagnosis:     ICD-10-CM    1. Cervical radiculopathy M54.12    2. Poor posture R29.3    3. Stiffness of cervical spine M43.6          Assessment:     HEP/POC compliance is  good .  Patient demonstrates understanding/independence with home program.  Response to Intervention Tolerated grade V manipulation and instrument assisted STM. Continued to work on thoracic ext with HEP.  Patient is benefitting from skilled physical therapy and is making steady progress toward functional goals.  Patient is appropriate to continue with skilled physical therapy intervention, as indicated by initial plan of care.    Goal Status:   Pt. will be independent with home exercise program in : 4 weeks (to self-manage pain and improve function) MET  Pt. will have improved quality of sleep: waking less times/night;in 12 weeks: Met   Patient will sit: 60 minutes;for work;with less pain;in 12 weeks (with less than 3/10 pain) MET  Pt will complete yard work for 1-2 hours with less than 3/10 pain in 4 weeks.        Plan / Patient Education:     Continue with initial plan of care.  Progress with home program as tolerated. Likely DC in 1-2 more sessions    Subjective:     Pain Ratin cervical spine- central, 2 L forearm  Did some yard work over the weekend and had some mild increased symptoms yesterday. Got a massage last week which really helped. Still has pain in the \"spot\" near her shoulder blade.     Objective:     Cervical AROM:   Flexion: WNL  Ext: WNL  Lateral flexion: L WNL, R WNL  Rotation: R WNL  L WNL  All cervical AROM is pain-free    Treatment Today     TREATMENT " MINUTES COMMENTS   Evaluation     Self-care/ Home management     Manual therapy 15 -prone instrument assisted STM, grade IV for muscle relaxation to joel thoracic paraspinals, joel levator scapulae and joel upper trapezius with hypertonicity on L  -prone central PAs to T1-T8, grade III-IV, x5 min total  -prone, grade V thoracic opening mobilization at T1, T4 and T8 with cavitations noted   Neuromuscular Re-education     Therapeutic Activity     Therapeutic Exercises 8 -UBE x4 min F/B, WL3.0  -see exercise flow sheet     Gait training     Modality_________________                Total 23    Blank areas are intentional and mean the treatment did not include these items.       Mary Boudreaux, PT, DPT  10/25/2017

## 2021-06-13 NOTE — PROGRESS NOTES
"[facility] Daily Progress     Patient Name: Lorraine Garza  Date: 10/9/2017  Visit #: 12+ 36 with updated order  PTA # 2- na  Referral Diagnosis:   Radiculitis of left cervical region [M54.12]  - Primary       Cervical disc herniation [M50.20]       Cervical stenosis of spinal canal [M48.02]         Referring provider: Marlin Polo  Visit Diagnosis:     ICD-10-CM    1. Cervical radiculopathy M54.12    2. Poor posture R29.3    3. Stiffness of cervical spine M43.6          Assessment:     HEP/POC compliance is  good .  Patient demonstrates understanding/independence with home program.  Response to Intervention Slight increase in \"stiffness\" from increased computer work last week. Continued decreased \"burning\" pain with thoracic STM.  Patient is benefitting from skilled physical therapy and is making steady progress toward functional goals.  Patient is appropriate to continue with skilled physical therapy intervention, as indicated by initial plan of care.    Goal Status: on-going  Pt. will be independent with home exercise program in : 4 weeks (to self-manage pain and improve function) MET  Pt. will have improved quality of sleep: waking less times/night;in 12 weeks: Met in this session, continue for consistency  Patient will sit: 60 minutes;for work;with less pain;in 12 weeks (with less than 3/10 pain) MET        Plan / Patient Education:     Continue with initial plan of care.  Progress with home program as tolerated. Likely DC in 1-2 more sessions    Subjective:     Pain Ratin cervical spine- central, 1 L elbow to wrist and proximally into tricep   A little bit of pain into her arm and stiffness into her neck. Exercises are going well.   Traction is okay at home. Has been sitting at the computer more.    Objective:     Cervical AROM:   Flexion: WNL  Ext: WNL  Lateral flexion: L WNL, R WNL  Rotation: R WNL  L WNL    Min cueing with nerve glides to improve hand position for " increased ROM    Treatment Today     TREATMENT MINUTES COMMENTS   Evaluation     Self-care/ Home management     Manual therapy 15 -prone instrument assisted STM, grade IV for muscle relaxation to joel thoracic paraspinals, joel levator scapulae and joel upper trapezius with hypertonicity on L  -prone central PAs to T1-T8, grade III-IV, x5 min total   Neuromuscular Re-education     Therapeutic Activity     Therapeutic Exercises 15 -UBE x4 min F/B, WL3.0  -see exercise flow sheet   Gait training     Modality_________________                Total 30    Blank areas are intentional and mean the treatment did not include these items.       Mary Boudreaux, PT, DPT  10/9/2017

## 2021-06-13 NOTE — PROGRESS NOTES
Assessment:     Diagnoses and all orders for this visit:    Radiculitis of left cervical region  -     cyclobenzaprine (FLEXERIL) 5 MG tablet; Take 1-2 tablets (5-10 mg total) by mouth 3 (three) times a day as needed for muscle spasms.  Dispense: 42 tablet; Refill: 1  -     naproxen (EC NAPROSYN) 500 MG EC tablet; Take 1 tablet (500 mg total) by mouth 2 (two) times a day as needed.  Dispense: 42 tablet; Refill: 1  -     HYDROcodone-acetaminophen 5-325 mg per tablet; Take 1 tablet by mouth daily as needed for pain (Severe pain).  Dispense: 14 tablet; Refill: 0    Cervical disc herniation  -     HYDROcodone-acetaminophen 5-325 mg per tablet; Take 1 tablet by mouth daily as needed for pain (Severe pain).  Dispense: 14 tablet; Refill: 0    Cervical stenosis of spinal canal    Cervical myofascial pain syndrome  -     cyclobenzaprine (FLEXERIL) 5 MG tablet; Take 1-2 tablets (5-10 mg total) by mouth 3 (three) times a day as needed for muscle spasms.  Dispense: 42 tablet; Refill: 1    Lorraine Knapp is a 41 y.o. y.o. female with past medical history significant for overweight who presents today for follow-up regarding Post left C6-7 TF ROBBIE in which she received 80% relief of her left cervical radiculitis C7 dermatomal pattern ×3 days however no relief thereafter.    Patient is neurologically intact on exam today.    Plan:     A shared decision making plan was used.  The patient's values and choices were respected. Prior medical records from 8/30/17 were reviewed today. The following represents what was discussed and decided upon by the provider and the patient.     -DIAGNOSTIC TESTS: Images were personally reviewed and interpreted.    --Reviewed cervical spine MRI today which reveals C6-7 superimposed small left posterior lateral disc protrusion with moderate central canal stenosis and effacement of the left C7 nerve root with mild bilateral foraminal stenosis.  There is also a C5-6 shallow left paracentral disc  protrusion that looks less pronounced also contributing to moderate central canal stenosis with left C6 nerve root compression however no foraminal stenosis noted.  Otherwise degenerative spondylosis noted.      -INTERVENTIONS: Did discuss the possibility of trialing a Left C5-6 transfemoral epidural steroid injection as she does have moderate spinal canal stenosis and left C6 nerve root compression at this level as well.  We also discussed the possibility of surgical opinion at this point, however she wants to hold off on that.    -MEDICATIONS: Refilled Flexeril and naproxen today.  --Advised patient to continue gabapentin as well and she can increase the nighttime dose to 100 mg, 6 tablets.  No refills needed today.  --Also refilled hydrocodone 5/325 mg 1 tablet daily as needed for severe breakthrough pain, #14 tablets given for 2 weeks worth.  Did discuss with patient that this is not something I would recommend ongoing however.  -MN  checked. Discussed the risks (eg, addiction, overdose, worsening pain) verses benefit of opioid use with patient today. Explained that this medication will not be a long term solution to ongoing pain. Discussed using lowest effective dose and the importance of other measures for pain management including PT, other non-opioid medications, behavioral treatments, and other procedure options.   Discussed side effects of medications and proper use. Patient verbalized understanding.    -PHYSICAL THERAPY: Referral to physical therapy [facility] previously sent, advised patient continue with home exercise program at this time.  Did discuss trialing low velocity chiropractic treatment, acupuncture and massage as well as conjunctive therapies myofascial pain as well as radicular pain.  Discussed the importance of core strengthening, ROM, stretching exercises with the patient and how each of these entities is important in decreasing pain.  Explained to the patient that the purpose of  physical therapy is to teach the patient a home exercise program.  These exercises need to be performed every day in order to decrease pain and prevent future occurrences of pain.        -PATIENT EDUCATION: 20 minutes of total visit time was spent face to face with the patient today, 60 % of the visit was spent on counseling, education, and coordinating care.   -5 minutes spent outside of visit time, non-face-to-face time, reviewing chart.    -FOLLOW UP: Follow-up as needed.  Advised patient to call the nurse navigator if she would like to try the injection we discussed today.  Advised to contact clinic if symptoms worsen or change.    Subjective:     Lorraine Knapp is a 41 y.o. female who presents today for follow-up regarding ongoing left-sided neck pain that radiates to the left lateral and periscapular shoulder as well as triceps and lateral elbow, forearm and into the index and pointer finger on the left with constant numbness ongoing since June.  She is 2 weeks post left C6-7 TF ROBBIE today in which she received 80% relief of her radicular pain symptoms ×3 days however feels her pain today is now back to baseline.  Currently her pain is a 4/10 up to a 6/10 at its worst which is bothersome with doing anything more than light work.    She denies right arm symptoms, denies recent trips or falls or balance changes, denies upper extremity weakness or issues with dropping things are fine motor skills, denies bowel or bladder dysfunction. No myelopathic symptoms.     Treatment to Date: No prior chiropractic care.  No prior spinal surgery.  Physical therapy ×9 sessions [facility] last 8/28/2017 with some relief.  She is diligent about doing HEP.    Left C6-7 TF ROBBIE 9/1/2017 with the percent relief ×3 days only.      Medications:  Tramadol with no relief.  Vicodin at nighttime with some relief.    Ibuprofen 600 mg 3-4 times daily with some relief.  Naproxen 500 mg 1 tablet twice daily with better relief than  ibuprofen.  Gabapentin 100 mg 3-3-4, with minimal relief    Current Outpatient Prescriptions on File Prior to Encounter   Medication Sig Dispense Refill     cetirizine (ZYRTEC) 10 mg cap Take 10 mg by mouth daily. 30 capsule 0     gabapentin (NEURONTIN) 100 MG capsule Take 300 mg by mouth 3 (three) times a day. Follow Gabapentin Dosing chart given 270 capsule 3     levonorgestrel-ethinyl estradiol (AVIANE,ALESSE,LESSINA) 0.1-20 mg-mcg per tablet Take 1 tablet by mouth daily. 90 tablet 1     multivitamin therapeutic (THERAGRAN) tablet Take 1 tablet by mouth daily.       [DISCONTINUED] cyclobenzaprine (FLEXERIL) 5 MG tablet Take 1 tablet (5 mg total) by mouth 3 (three) times a day as needed for muscle spasms. 30 tablet 1     [DISCONTINUED] naproxen (EC NAPROSYN) 500 MG EC tablet Take 1 tablet by mouth 2 (two) times a day.  1     ibuprofen (ADVIL,MOTRIN) 600 MG tablet Take 600 mg by mouth every 6 (six) hours as needed for pain.       [DISCONTINUED] HYDROcodone-acetaminophen 5-325 mg per tablet Take 1 tablet by mouth daily as needed for pain (Severe pain). 14 tablet 0     No current facility-administered medications on file prior to encounter.        No Known Allergies    Past Medical History:   Diagnosis Date     Seasonal allergies      TMJ (temporomandibular joint disorder)         Review of Systems  ROS: Positive for numbness and tingling.  Specifically negative for bowel/bladder dysfunction, balance changes, headache, dizziness, foot drop, fevers, chills, appetite changes, nausea/vomiting, unexplained weight loss. Otherwise 13 systems reviewed are negative. Please see the patient's intake questionnaire from today for details.    Reviewed Social, Family, Past Medical and Past Surgical history with patient, no significant changes noted since prior visit.     Objective:     BP (!) 166/94 (Patient Site: Right Arm, Patient Position: Sitting)  Pulse (!) 108  Temp 98.5  F (36.9  C) (Oral)   Wt (!) 247 lb (112 kg)  LMP  08/01/2017  SpO2 96%  BMI 41.74 kg/m2    PHYSICAL EXAMINATION:   --CONSTITUTIONAL: Vital signs as above. No acute distress. The patient is well nourished and well groomed.  --PSYCHIATRIC:  The patient is awake, alert, oriented to person, place, time and answering questions appropriately with clear speech. Appropriate mood and affect   --HEENT: Sclera are non-injected. Extraocular muscles are intact.   --SKIN: Skin over the face, bilateral lower extremities, and posterior torso is clean, dry, intact without rashes.  --RESPIRATORY: Normal rhythm and effort. No abnormal accessory muscle breathing patterns noted.   --GROSS MOTOR: Easily arises from a seated position.   --CERVICAL SPINE: Inspection reveals no evidence of deformity. Range of motion is not limited in cervical flexion, extension, or lateral rotation. No tenderness to palpation.    --SHOULDERS: Full range of motion bilaterally. Negative empty can.  --UPPER EXTREMITY MOTOR TESTING:  Wrist flexion left 5/5, right 5/5  Wrist extension left 5/5, right 5/5  Pronators left 5/5, right 5/5  Biceps left 5/5, right 5/5   Triceps left 5/5, right 5/5   Shoulder abduction left 5/5, right 5/5   left 5/5, right 5/5  --NEUROLOGIC: CN III-XII are grossly intact. 2/4 symmetric biceps, brachioradialis, triceps reflexes bilaterally. Sensation to upper extremities is intact on the right, diminished on the left into the pointer finger only. Negative Squires's bilaterally.   --VASCULAR: Warm upper limbs bilaterally.     Imaging of the cervical spine: MRI of the cervical spine was reviewed today. The images were shown to the patient and the findings were explained using a spine model.      Xr Cervical Spine 2 - 3 Vws  Xr Thoracic Spine 2 Vws  Result Date: 7/11/2017  XR CERVICAL SPINE 2 - 3 VWS, XR THORACIC SPINE 2 VWS 7/10/2017 2:55 PM INDICATION: upper back pain radiating to left arm COMPARISON: None. FINDINGS: Cervical spine: The cervical spinal is visualized to the C7  vertebral body on the lateral view. There is mild retrolisthesis of C3 on C4, mild anterolisthesis of C4 on C5. There is no acute fracture. There is mild loss of disc height space at C5-C6 with endplate osteophyte formation. No prevertebral soft tissue swelling. No nodules in the visualized lung apices. Open-mouth odontoid view is suboptimal secondary to overlapping structures. The spinous process of C2 projects over the space between the right lateral mass of C1 and the dens, possibly on the basis of patient rotation. Mandibular fixation hardware is noted. Thoracic spine: The cervicothoracic junction is not well evaluated secondary to overlapping structures. Within this constraint, there is mild dextroconvex curvature of the midthoracic spine and levoconvex curvature of the lower thoracic spine. There is no acute fracture  in the visualized portions. The visualized lung parenchyma is well aerated.           Mr Cervical Spine Without Contrast  Result Date: 7/26/2017  INDICATION: Neck pain extending into left shoulder and arm. Symptoms five weeks. TECHNIQUE: Unenhanced. COMPARISON: None. FINDINGS: C2-C3: Normal disc height. Mild degenerative facet arthropathy.. No significant central canal or neural foraminal stenosis. C3-C4: Normal disc height. Mild posterolateral spondylotic ridging on the left. Annular bulge. Small shallow left paracentral disc protrusion. Mild degenerative facet arthropathy. Mild to moderate central canal stenosis, there is mild deformity and flattening of the ventral surface of the cervical cord to the left of midline. No significant foraminal stenosis. C4-C5: Normal disc height. Slight posterolateral spondylotic ridging eccentric right. There is likely a small right posterolateral disc protrusion. Facet joints negative. Mild central canal stenosis. Mild right foraminal stenosis. C5-C6: Mild disc space loss. Interbody spurring and spondylotic ridging. Annular bulge and likely a shallow left  paracentral disc protrusion. Moderate central canal stenosis with mild deformity and flattening of the ventral surface of the cord to the left of midline and mild effacement of the left C6 nerve root sleeve centrally. Facet joints negative. No significant foraminal stenosis. C6-C7: Mild disc space loss. Interbody spurring and spondylotic ridging. Annular bulge. There is a superimposed small left posterolateral disc protrusion. Facet joints negative. Moderate central canal stenosis. There is effacement of the left C7 nerve root sleeve centrally. Mild bilateral foraminal stenosis. C7-T1: Normal disc height. Facet joints negative. No significant central canal or neural foraminal stenosis. Craniocervical junction negative. No cord signal abnormality. Slight reversal of the usual cervical lordosis.. No significant marrow signal abnormality. No significant paravertebral soft tissue abnormality.   CONCLUSION:  1.  Degenerative changes and spondylosis in the cervical spine as described above.   2.  At C6-C7, there is moderate central canal stenosis. There is effacement of the left C7 nerve root sleeve centrally secondary to disc osteophyte complex.   3.  At C5-C6, there is moderate central canal stenosis with flattening of the ventral surface of the cord to the left of midline, and mild effacement of the left C6 nerve root sleeve centrally, secondary to disc osteophyte complex.   4.  At C4-C5, there is mild central canal stenosis.   5.  At C3-C4, there is moderate central canal stenosis with flattening of the ventral surface of the cord to the left of midline, secondary to disc osteophyte complex.   6.  No high-grade foraminal stenosis.

## 2021-06-14 NOTE — ANESTHESIA PREPROCEDURE EVALUATION
Anesthesia Evaluation      Patient summary reviewed   History of anesthetic complications (ponv - thought due to blood in stomach after TMJ procedure)     Airway   Mallampati: III  Neck ROM: full   Pulmonary - normal exam    breath sounds clear to auscultation                         Cardiovascular - normal exam  Exercise tolerance: > or = 4 METS   Neuro/Psych      Comments: Cervicalgia due to disk herniation - now resolved with full ROM    Endo/Other    (+) obesity (bmi 40),      GI/Hepatic/Renal - negative ROS           Dental    (+) caps                       Anesthesia Plan  Planned anesthetic: MAC  Propofol ggt - ketamine PRN  ASA 3   Induction: intravenous   Anesthetic plan and risks discussed with: patient and significant other  Anesthesia plan special considerations: antiemetics,   Post-op plan: routine recovery        Results for orders placed or performed during the hospital encounter of 12/06/17   POCT Pregnancy (Beta-hCG, Qual), Urine (Point of Care) on DOS   Result Value Ref Range    POC Preg, Urine Negative Negative    POCt Kit Lot Number [id number]     POCT Kit Expiration Date 5/19      Lab Results   Component Value Date    HGB 13.1 11/29/2017

## 2021-06-14 NOTE — PROGRESS NOTES
[facility] Daily Progress / Discharge Summary    Patient Name: Lorraine Knapp  Date: 11/15/2017  Visit #: 12+  (17 total)  PTA visit #:  na  Referral Diagnosis:   Radiculitis of left cervical region [M54.12]  - Primary       Cervical disc herniation [M50.20]       Cervical stenosis of spinal canal [M48.02]         Referring provider: Marlin Polo  Visit Diagnosis:     ICD-10-CM    1. Cervical radiculopathy M54.12    2. Poor posture R29.3    3. Stiffness of cervical spine M43.6          Assessment:   HEP/POC compliance is  good .  Response to Intervention Pt has met all PT goals. She has shown improvements in strength, ROM, function and decreased n/t.   Patient has benefitted from skilled physical therapy and is making steady progress toward functional goals.    Goal Status:  Pt. will be independent with home exercise program in : 4 weeks (to self-manage pain and improve function) MET  Pt. will have improved quality of sleep: waking less times/night;in 12 weeks: Met   Patient will sit: 60 minutes;for work;with less pain;in 12 weeks (with less than 3/10 pain) MET  Pt will complete yard work for 1-2 hours with less than 3/10 pain in 4 weeks; MET    Plan / Patient Education:     Initial plan of care has been completed. Patient has responded appropriately to skilled PT intervention.  The patient met goals and has demonstrated understanding of/independence in the home program for self-care and progression to next steps.  The patient was issued cervical traction unit and instructed in proper usage.  The patient will initiate contact if questions or concerns arise.    Subjective:     Pain Ratin  Able to rake for 2-3 hours without any increase in pain. Decreased numbness- mostly just in the tip of the finger now and more intermittent.    Patient Outcome Measures  No Data Recorded   Scores range from 0-100%, where a score of 0% represents minimal pain and maximal function. The minmal clinically  important difference is a score reduction of 10%.      Objective:     Cervical ROM:    Date: 8/2/2017 11/15/2017    *Indicate scale AROM AROM AROM   Cervical Flexion Mild with pain WNL with stiffness    Cervical Extension mod WNL     Right Left Right Left Right Left   Cervical Sidebending mild mild WNL WNL     Cervical Rotation 60 55 with stiffness/pain 70 70     Cervical Protraction      Cervical Retraction      Thoracic Flexion      Thoracic Extension      Thoracic Sidebending         Thoracic Rotation           UE Strength 5/5 joel    Treatment Today  TREATMENT MINUTES COMMENTS   Evaluation     Self-care/ Home management     Manual therapy 10 -prone instrument assisted STM, grade IV for muscle relaxation and STM to joel thoracic paraspinals, joel levator scapulae and joel upper trapezius    Neuromuscular Re-education     Therapeutic Activity     Therapeutic Exercises 15 -see exercise flow sheet  -UBE x4 min F/B, WL 4.0  -educated on discharge: continue with HEP 3-5x/week for 3-6 months, continue with use of traction unit as needed and will leave chart open for 1 month  -verbal review of HEP   Gait training     Modality__________________                Total 25    Blank areas are intentional and mean the treatment did not include these items.     Mary Boudreaux, PT, DPT  11/15/2017

## 2021-06-14 NOTE — PROGRESS NOTES
Assessment/Plan:      Visit for Preoperative Exam.      Patient approved for surgery with general or local anesthesia. Labs will be done as indicated. Above recommendations were reviewed with the patient. Follow up as needed. Proceed with proposed surgery without additional clinical clarifications. Low Risk Surgery.     No significant risk factors for planned procedure.  We will check hemoglobin.  Pregnancy test is negative.    She was advised to stop her multivitamin 1 week before procedure.  Hold naproxen 2-3 days before procedure.  Restart after procedure.    She will continue to monitor her blood pressure at home and continue to work on lifestyle changes.  Discussed that if she has persistent elevation of blood pressures above 140/90, she should follow-up to discuss initiation of antihypertensive medication.    Subjective:     Scheduled Procedure: endometrial ablation  Surgery Date:  12/6/2017   Surgery Location:  [facility]   Surgeon:  Dr. Womack    42-year-old female presents today for preoperative examination.  She has a history of menorrhagia.  She was previously taking oral contraceptive pills.  Starting to develop elevated blood pressure readings so wanted to consider alternative options for management of menorrhagia.  Had consultation with gynecologist and is now scheduled for endometrial ablation.  She discontinued birth control pills approximately 1 month ago.  She is monitoring her blood pressure at home and has noticed her blood pressure improving since discontinuing the birth control pills.  Most recently, readings at home have been about 140s systolically.  She is also working on some lifestyle changes and has been trying to watch her diet and increase her physical activity level.  We reviewed her medications and allergies.  Other past medical history is notable for cervical radiculopathy.  She uses cyclobenzaprine and gabapentin and naproxen on an as-needed basis for symptoms associated  with this.  She also takes cetirizine for environmental allergies.  Review of systems is assessed and is otherwise negative.  She has no other concerns or questions today.    Current Outpatient Prescriptions   Medication Sig Dispense Refill     cetirizine (ZYRTEC) 10 mg cap Take 10 mg by mouth daily. 30 capsule 0     cyclobenzaprine (FLEXERIL) 5 MG tablet Take 1-2 tablets (5-10 mg total) by mouth 3 (three) times a day as needed for muscle spasms. 42 tablet 1     gabapentin (NEURONTIN) 100 MG capsule Take 300 mg by mouth 3 (three) times a day. Follow Gabapentin Dosing chart given 270 capsule 3     multivitamin therapeutic (THERAGRAN) tablet Take 1 tablet by mouth daily.       naproxen (EC NAPROSYN) 500 MG EC tablet Take 1 tablet (500 mg total) by mouth 2 (two) times a day as needed. 42 tablet 1     No current facility-administered medications for this visit.        No Known Allergies    Immunization History   Administered Date(s) Administered     Influenza, seasonal,quad inj 36+ mos 10/12/2017     Tdap 2008       There is no problem list on file for this patient.      Past Medical History:   Diagnosis Date     Seasonal allergies      TMJ (temporomandibular joint disorder)        Social History     Social History     Marital status:      Spouse name: N/A     Number of children: N/A     Years of education: N/A     Occupational History     Not on file.     Social History Main Topics     Smoking status: Never Smoker     Smokeless tobacco: Never Used     Alcohol use No     Drug use: No     Sexual activity: Yes     Partners: Male     Birth control/ protection: Surgical      Comment: hus had vas     Other Topics Concern     Not on file     Social History Narrative       Past Surgical History:   Procedure Laterality Date      SECTION      and      TEMPOROMANDIBULAR JOINT SURGERY Bilateral 1994       History of Present Illness  Recent Health  Fever: no  Chills: no  Fatigue: no  Chest Pain:  "no  Cough: no  Dyspnea: no  Urinary Frequency: no  Nausea: no  Vomiting: no  Diarrhea: no  Abdominal Pain: no  Easy Bruising: no  Lower Extremity Swelling: no  Poor Exercise Tolerance: no    Most recent Health Maintenance Visit:  2 month(s) ago    Pertinent History  Prior Anesthesia: yes  Previous Anesthesia Reaction:  no  Diabetes: no  Cardiovascular Disease: no  Pulmonary Disease: no  Renal Disease: no  GI Disease: no  Sleep Apnea: no  Thromboembolic Problems: no  Clotting Disorder: no  Bleeding Disorder: no  Transfusion Reaction: no  Impaired Immunity: no  Steroid use in the last 6 months: yes--medrol dose pack  Frequent Aspirin use: no    Family history of no pertinent family history    Social history of there are no concerns regarding care after surgery    After surgery, the patient plans to recover at home with family.    Review of Systems  Pertinent items are noted in HPI.          Objective:         Vitals:    11/29/17 0845   BP: (!) 155/93   Pulse: 90   Temp: 98.5  F (36.9  C)   TempSrc: Oral   SpO2: 98%   Weight: (!) 246 lb 3 oz (111.7 kg)   Height: 5' 5\" (1.651 m)       Physical Exam:  Physical Exam:  General Appearance: Alert, cooperative, no distress, appears stated age  Head: Normocephalic, without obvious abnormality, atraumatic  Eyes: PERRL, conjunctiva/corneas clear, EOM's intact  Ears: Normal TM's and external ear canals, both ears  Nose: Nares normal, septum midline,mucosa normal, no drainage  Throat: Lips, mucosa, and tongue normal; teeth and gums normal  Neck: Supple, symmetrical, trachea midline, no adenopathy;  thyroid: not enlarged, symmetric, no tenderness/mass/nodules;   Back: Symmetric, no curvature, ROM normal  Lungs: Clear to auscultation bilaterally, respirations unlabored  Heart: Regular rate and rhythm, S1 and S2 normal, no murmur, rub, or gallop, Abdomen: Soft, non-tender, bowel sounds active all four quadrants,  no masses, no organomegaly  Pelvic:Not examined  Extremities: " Extremities normal, atraumatic, no cyanosis or edema  Skin: Skin color, texture, turgor normal, no rashes or lesions  Lymph nodes: Cervical, supraclavicular, and axillary nodes normal  Neurologic: Normal     Recent Results (from the past 48 hour(s))   Pregnancy (Beta-hCG, Qual), Urine    Collection Time: 11/29/17  8:53 AM   Result Value Ref Range    Pregnancy Test, Urine Negative Negative    Specific Gravity, UA 1.020 1.001 - 1.030

## 2021-06-14 NOTE — ANESTHESIA POSTPROCEDURE EVALUATION
Patient: Lorraine Knapp  HYSTEROSCOPY D&C SALVATORE ABLATION  Anesthesia type: MAC    Patient location: Phase II Recovery  Last vitals:   Vitals:    12/06/17 0840   BP: (P) 162/89   Pulse: (P) 72   Resp: (P) 16   Temp:    SpO2: (P) 96%     Post vital signs: stable  Level of consciousness: awake and responds to simple questions  Post-anesthesia pain: pain controlled  Post-anesthesia nausea and vomiting: no  Pulmonary: unassisted, return to baseline  Cardiovascular: stable and blood pressure at baseline  Hydration: adequate  Anesthetic events: no    QCDR Measures:  ASA# 11 - Bia-op Cardiac Arrest: ASA11B - Patient did NOT experience unanticipated cardiac arrest  ASA# 12 - Bia-op Mortality Rate: ASA12B - Patient did NOT die  ASA# 13 - PACU Re-Intubation Rate: ASA13B - Patient did NOT require a new airway mgmt  ASA# 10 - Composite Anes Safety: ASA10A - No serious adverse event    Additional Notes:

## 2021-06-14 NOTE — ANESTHESIA CARE TRANSFER NOTE
Last vitals:   Vitals:    12/06/17 0614   BP: 159/84   Pulse: 81   Resp: 16   Temp: 36.5  C (97.7  F)   SpO2: 97%     Patient's level of consciousness is awake  Spontaneous respirations: yes  Maintains airway independently: yes  Dentition unchanged: yes  Oropharynx: oropharynx clear of all foreign objects    QCDR Measures:  ASA# 20 - Surgical Safety Checklist: WHO surgical safety checklist completed prior to induction  PQRS# 430 - Adult PONV Prevention: 4558F - Pt received => 2 anti-emetic agents (different classes) preop & intraop  ASA# 8 - Peds PONV Prevention: NA - Not pediatric patient, not GA or 2 or more risk factors NOT present  PQRS# 424 - Bia-op Temp Management: 4559F - At least one body temp DOCUMENTED => 35.5C or 95.9F within required timeframe  PQRS# 426 - PACU Transfer Protocol: - Transfer of care checklist used  ASA# 14 - Acute Post-op Pain: ASA14B - Patient did NOT experience pain >= 7 out of 10. Vss

## 2021-06-16 PROBLEM — Z98.890 S/P ENDOMETRIAL ABLATION: Status: ACTIVE | Noted: 2017-12-06

## 2021-07-03 NOTE — ADDENDUM NOTE
Addendum Note by Mary Whelan PT at 8/23/2017  1:55 PM     Author: Mary Whelan PT Service: -- Author Type: Physical Therapist    Filed: 8/23/2017  1:55 PM Encounter Date: 8/21/2017 Status: Signed    : Mary Whelan PT (Physical Therapist)    Addended by: MARY PINEDA on: 8/23/2017 01:55 PM        Modules accepted: Orders

## 2021-07-03 NOTE — ADDENDUM NOTE
Addendum Note by Alexis Louis CMA at 8/30/2017  8:27 AM     Author: Alexis Louis CMA Service: -- Author Type: Certified Medical Assistant    Filed: 8/30/2017  8:27 AM Date of Service: 8/30/2017  8:27 AM Status: Signed    : Alexis Louis CMA (Certified Medical Assistant)    Encounter addended by: Alexis Louis CMA on: 8/30/2017  8:27 AM<BR>     Actions taken: Vitals modified

## 2023-01-06 ENCOUNTER — HOSPITAL ENCOUNTER (EMERGENCY)
Facility: HOSPITAL | Age: 48
Discharge: HOME | End: 2023-01-06
Attending: EMERGENCY MEDICINE | Admitting: EMERGENCY MEDICINE
Payer: COMMERCIAL

## 2023-01-06 ENCOUNTER — APPOINTMENT (OUTPATIENT)
Dept: MRI IMAGING | Facility: HOSPITAL | Age: 48
End: 2023-01-06
Payer: COMMERCIAL

## 2023-01-06 ENCOUNTER — APPOINTMENT (OUTPATIENT)
Dept: RADIOLOGY | Facility: HOSPITAL | Age: 48
End: 2023-01-06
Attending: EMERGENCY MEDICINE
Payer: COMMERCIAL

## 2023-01-06 ENCOUNTER — APPOINTMENT (OUTPATIENT)
Dept: CT IMAGING | Facility: HOSPITAL | Age: 48
End: 2023-01-06
Attending: STUDENT IN AN ORGANIZED HEALTH CARE EDUCATION/TRAINING PROGRAM
Payer: COMMERCIAL

## 2023-01-06 ENCOUNTER — APPOINTMENT (OUTPATIENT)
Dept: MRI IMAGING | Facility: HOSPITAL | Age: 48
End: 2023-01-06
Attending: EMERGENCY MEDICINE
Payer: COMMERCIAL

## 2023-01-06 VITALS
DIASTOLIC BLOOD PRESSURE: 92 MMHG | WEIGHT: 240 LBS | RESPIRATION RATE: 20 BRPM | BODY MASS INDEX: 39.99 KG/M2 | OXYGEN SATURATION: 97 % | SYSTOLIC BLOOD PRESSURE: 149 MMHG | TEMPERATURE: 98.5 F | HEIGHT: 65 IN | HEART RATE: 87 BPM

## 2023-01-06 DIAGNOSIS — R20.2 LEFT FACE AND LEFT ARM TINGLING: ICD-10-CM

## 2023-01-06 DIAGNOSIS — R03.0 ELEVATED BLOOD PRESSURE READING WITHOUT DIAGNOSIS OF HYPERTENSION: ICD-10-CM

## 2023-01-06 DIAGNOSIS — E83.42 HYPOMAGNESEMIA: ICD-10-CM

## 2023-01-06 LAB
ANION GAP SERPL CALCULATED.3IONS-SCNC: 10 MMOL/L (ref 7–15)
APTT PPP: 29 SECONDS (ref 22–38)
BUN SERPL-MCNC: 6.3 MG/DL (ref 6–20)
CALCIUM SERPL-MCNC: 9.1 MG/DL (ref 8.6–10)
CHLORIDE SERPL-SCNC: 101 MMOL/L (ref 98–107)
CREAT SERPL-MCNC: 0.55 MG/DL (ref 0.51–0.95)
DEPRECATED HCO3 PLAS-SCNC: 26 MMOL/L (ref 22–29)
ERYTHROCYTE [DISTWIDTH] IN BLOOD BY AUTOMATED COUNT: 13 % (ref 10–15)
GFR SERPL CREATININE-BSD FRML MDRD: >90 ML/MIN/1.73M2
GLUCOSE BLDC GLUCOMTR-MCNC: 181 MG/DL (ref 70–99)
GLUCOSE SERPL-MCNC: 178 MG/DL (ref 70–99)
HCT VFR BLD AUTO: 42.3 % (ref 35–47)
HGB BLD-MCNC: 14.1 G/DL (ref 11.7–15.7)
HOLD SPECIMEN: NORMAL
INR PPP: 1 (ref 0.85–1.15)
MAGNESIUM SERPL-MCNC: 1.6 MG/DL (ref 1.7–2.3)
MCH RBC QN AUTO: 27.7 PG (ref 26.5–33)
MCHC RBC AUTO-ENTMCNC: 33.3 G/DL (ref 31.5–36.5)
MCV RBC AUTO: 83 FL (ref 78–100)
PLATELET # BLD AUTO: 275 10E3/UL (ref 150–450)
POTASSIUM SERPL-SCNC: 3.4 MMOL/L (ref 3.4–5.3)
RBC # BLD AUTO: 5.09 10E6/UL (ref 3.8–5.2)
SODIUM SERPL-SCNC: 137 MMOL/L (ref 136–145)
TROPONIN T SERPL HS-MCNC: <6 NG/L
WBC # BLD AUTO: 7.2 10E3/UL (ref 4–11)

## 2023-01-06 PROCEDURE — 85730 THROMBOPLASTIN TIME PARTIAL: CPT | Performed by: STUDENT IN AN ORGANIZED HEALTH CARE EDUCATION/TRAINING PROGRAM

## 2023-01-06 PROCEDURE — 85014 HEMATOCRIT: CPT | Performed by: STUDENT IN AN ORGANIZED HEALTH CARE EDUCATION/TRAINING PROGRAM

## 2023-01-06 PROCEDURE — 71046 X-RAY EXAM CHEST 2 VIEWS: CPT

## 2023-01-06 PROCEDURE — 99207 PR NO CHARGE LOS: CPT | Performed by: STUDENT IN AN ORGANIZED HEALTH CARE EDUCATION/TRAINING PROGRAM

## 2023-01-06 PROCEDURE — 250N000011 HC RX IP 250 OP 636: Performed by: EMERGENCY MEDICINE

## 2023-01-06 PROCEDURE — 80048 BASIC METABOLIC PNL TOTAL CA: CPT | Performed by: STUDENT IN AN ORGANIZED HEALTH CARE EDUCATION/TRAINING PROGRAM

## 2023-01-06 PROCEDURE — 93005 ELECTROCARDIOGRAM TRACING: CPT | Performed by: STUDENT IN AN ORGANIZED HEALTH CARE EDUCATION/TRAINING PROGRAM

## 2023-01-06 PROCEDURE — 70551 MRI BRAIN STEM W/O DYE: CPT

## 2023-01-06 PROCEDURE — 70498 CT ANGIOGRAPHY NECK: CPT

## 2023-01-06 PROCEDURE — 85610 PROTHROMBIN TIME: CPT | Performed by: STUDENT IN AN ORGANIZED HEALTH CARE EDUCATION/TRAINING PROGRAM

## 2023-01-06 PROCEDURE — 96365 THER/PROPH/DIAG IV INF INIT: CPT | Mod: 59

## 2023-01-06 PROCEDURE — 84484 ASSAY OF TROPONIN QUANT: CPT | Performed by: STUDENT IN AN ORGANIZED HEALTH CARE EDUCATION/TRAINING PROGRAM

## 2023-01-06 PROCEDURE — 83735 ASSAY OF MAGNESIUM: CPT | Performed by: EMERGENCY MEDICINE

## 2023-01-06 PROCEDURE — 70544 MR ANGIOGRAPHY HEAD W/O DYE: CPT

## 2023-01-06 PROCEDURE — 36415 COLL VENOUS BLD VENIPUNCTURE: CPT | Performed by: STUDENT IN AN ORGANIZED HEALTH CARE EDUCATION/TRAINING PROGRAM

## 2023-01-06 PROCEDURE — 99285 EMERGENCY DEPT VISIT HI MDM: CPT | Mod: 25

## 2023-01-06 RX ORDER — MAGNESIUM SULFATE HEPTAHYDRATE 40 MG/ML
2 INJECTION, SOLUTION INTRAVENOUS ONCE
Status: COMPLETED | OUTPATIENT
Start: 2023-01-06 | End: 2023-01-06

## 2023-01-06 RX ORDER — IOPAMIDOL 755 MG/ML
75 INJECTION, SOLUTION INTRAVASCULAR ONCE
Status: COMPLETED | OUTPATIENT
Start: 2023-01-06 | End: 2023-01-06

## 2023-01-06 RX ADMIN — IOPAMIDOL 75 ML: 755 INJECTION, SOLUTION INTRAVENOUS at 07:42

## 2023-01-06 RX ADMIN — MAGNESIUM SULFATE HEPTAHYDRATE 2 G: 40 INJECTION, SOLUTION INTRAVENOUS at 09:35

## 2023-01-06 ASSESSMENT — ENCOUNTER SYMPTOMS
RHINORRHEA: 0
FEVER: 0
NUMBNESS: 1
ABDOMINAL PAIN: 0
HEADACHES: 0
VOMITING: 0
SHORTNESS OF BREATH: 0
BACK PAIN: 0
TROUBLE SWALLOWING: 0
WEAKNESS: 0
DIARRHEA: 0
CHILLS: 0

## 2023-01-06 ASSESSMENT — ACTIVITIES OF DAILY LIVING (ADL)
ADLS_ACUITY_SCORE: 35
ADLS_ACUITY_SCORE: 35

## 2023-01-06 NOTE — CONSULTS
[facility]    Stroke Telephone Note    I was called by Dunia Tapia on 01/06/23 regarding patient Lorraine Knapp.     Lorraine Knapp is a 46 YO F w/no known vascular RFs and PMHx of cervicalgia 2/2 cervical spondylosis/DJD who presents as a tier 2 stroke code on 1/4 for wake up symptoms of L face/LUE paresthesias, LUE weakness.    LKW: 2300 night prior when she went to bed  First seen sick: woke up with symptoms at 0530    In ED, exam reported to be notable for L face/LUE sensory disturbance only, no objective motor weakness.     In ED /88. Initial labs pending.     NCCT: ASPECTS 10.   CTA H/N: Somewhat artifact/motion degraded, no high grade stenosis/LVO, mild bilat PCA irregularity/stenosis     Stroke Code Data (for stroke code without tele)  Stroke code activated 01/06/23   0700   Stroke provider first response  01/06/23   0702            Last known normal 01/05/23   2300        Time of discovery   (or onset of symptoms) 01/06/23   0530   Head CT read by Stroke Neuro Dr/Provider 01/06/23   0740   Was stroke code de-escalated? Yes 01/06/23 0745            Intravenous Thrombolysis  Not given due to:   - minor/isolated/quickly resolving symptoms  - unclear or unfavorable risk-benefit profile for extended window thrombolysis beyond the conventional 4.5 hour time window    Endovascular Treatment  Not initiated due to absence of proximal vessel occlusion    Impression/Recommendation  # Stroke mimic vs less likely small stroke. No known vascular RFs, CTA H/N reassuring against high grade stenosis/dissection. Given wake up symptoms without disabling deficits, TNK was not offered/pursued.   - MRI brain w/o ordered to r/o vascular etioloogy  - If MRI is positive for acute/subacute stroke please page stroke neurology, otherwise pt can f/u with Gen Neuro as outpt for stroke mimic (HA spectrum cervical DJD)    My recommendations are based on the information provided over the phone  Chief Complaints and History of Present Illnesses   Patient presents with     Cataract     Chief Complaint(s) and History of Present Illness(es)     Cataract     Laterality: both eyes    Associated symptoms: blurred vision, glare and a need for brighter lights    Severity: moderate    Duration: months              Comments     +watery  +float  Yusra Mancia COT 8:26 AM January 28, 2021                    "by Lorraine Knapp's in-person providers. They are not intended to replace the clinical judgment of her in-person providers. I was not requested to personally see or examine the patient at this time.    The Stroke Staff is Dr. Burgos.    Helder Gonzalez MD  Vascular Neurology Fellow    To page me or covering stroke neurology team member, click here: [facility]  Choose \"On Call\" tab at top, then select \"NEUROLOGY/ALL SITES\" from middle drop-down box, press Enter, then look for \"stroke\" or \"telestroke\" for your site.      "

## 2023-01-06 NOTE — ED NOTES
Returned from MRI.  Vss.  S/o at bs.  Awaits dispo. No c/o at this time.  All sx still back to normal/baseline for pt. Watching tv.

## 2023-01-06 NOTE — ED TRIAGE NOTES
Pt reports onset of left side arm weakness and left sided facial tingling at about 05:30 this morning when she woke up. Pt also reports some haziness in her thought process. LNW was at 2300. Dr. Tapia performed neuro assessment in triage. Tier 2 stroke code was called.

## 2023-01-06 NOTE — DISCHARGE INSTRUCTIONS
Read and follow the discharge instructions.    Your chest x-ray CT of the head and MRI of your head were normal.    Given that we do not know the cause of the symptoms on your blood pressure was slightly elevated make sure you call your primary care doctor today to make a follow-up appointment.    Your magnesium was slightly low I will make sure you have a recheck.    Return immediately or call 911 for return of symptoms chest pain shortness of breath feeling fainting or any other concerns.

## 2023-01-06 NOTE — ED PROVIDER NOTES
EMERGENCY DEPARTMENT ENCOUNTER      NAME: Lorraine Knapp  AGE: 47 year old female  YOB: 1975  MRN: [id number]  EVALUATION DATE & TIME: No admission date for patient encounter.    PCP: Kisha Hernandez    ED PROVIDER: Dunia Tapia M.D.      CHIEF COMPLAINT     Chief Complaint   Patient presents with     Extremity Weakness     Facial tingling         FINAL IMPRESSION:     1. Left face and left arm tingling    2. Hypomagnesemia    3. Elevated blood pressure reading without diagnosis of hypertension          MEDICAL DECISION MAKING:       Pertinent Labs & Imaging studies reviewed. (See chart for details)    47 year old female presents to the Emergency Department for evaluation of tingling on the left side of the face weakness on the left arm.    ED Course as of 01/06/23 1120   Fri Jan 06, 2023   0701 Mrs. Knapp 47-year-old female who presents here with her .  She went to bed around 11 PM.  She woke up with her normal.  30 and felt like things were well.  Felt fuzzy some tingling on the left side of the face and some tingling and numbness on the left arm.   0702 Has history of tingling and numbness on the left arm usually the index and middle finger from previous cervical disc herniation per her report.  Denies any trauma.   0702 denies any chest pain or shortness of breath but states she started to feel anxious.   0702 No recent travel no recent trauma felt like she had a cold.   0702 Examination she is well-appearing no respiratory distress.  Cranial nerves II through XII are intact gait is steady   0702 Given she woke up with symptoms stroke tier 2 was called   0759 Spoke with stroke neurologist recommended de-escalating the stroke code.  Does recommend MRI brain with and without contrast this was ordered by him.   0759 Spoke with neuroradiologist there is some stenosis on the right HELGA recommend MRA of the brain this was ordered by me.  Patient updated.   0825 Patient states she feels back  to normal.  No longer having left-sided face tingling they weakness of the left arm is resolved but she does have her normal tingling that she has had before.  Does have some nasal congestion and a cough declines any evaluation for this.  Her  states he their child tested negative for COVID.   0848 Magnesium 1.6 will replace.   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   1115 Patient updated.  She has complete resolution of her symptoms.  Discussed with her importance of follow-up and return precautions.   1116 Clinical impression and decision making  47-year-old female presents here with left-sided tingling sensation on the left arm numbness weakness.   1116 NIH score 1   1116 CTA head and neck no acute stone right ICA stenosis recommend MRA.   1116 Low NIH not a candidate for TNKase and now with complete resolution of symptoms.   1118 Will get patient to follow-up primary care doctor and return immediately or call 911 for return of symptoms.   1118 There etiologies such as acute coronary syndrome we are entertain given the left arm weakness.  Normal EKG normal troponin symptoms within 6 hours of arrival.   1118 Dissection was also entertained no chest pain no back pain our clinic arch normal per radiologist.   1119 No risk factors for pulmonary embolism denies chest pain denies shortness of breath.  Complete resolution of tingling.       Vital Signs: hypertension  EKG: Sinus rhythm with sinus arrhythmia normal axis normal anterior progression  Imaging: cta head and neck no acute except for some narrowing on the right HELGA recommends MRI  Home Meds: reviewed  ED meds/abx: Reviewed  Fluids: None oral    Labs  K 3.4  Cr 0.55   Wbc 7.2  Hgb 14.1  Platelets 275  Troponin negative    Medical Decision Making    History:    Supplemental history from: Family Member/Significant Other    External  Record(s) reviewed: Outpatient Record: [facility]    Work Up:    Chart documentation includes differential considered and any EKGs or imaging independently interpreted by provider.    In additional to work up documented, I considered the following work up: See chart documentation, if applicable.    External consultation:    Discussion of management with another provider: Neurology, Radiology    Complicating factors:    Care impacted by chronic illness: N/A    Care affected by social determinants of health: N/A    Disposition considerations: Discharge. No recommendations on prescription strength medication(s). I considered admission, but ultimately discharged patient Admission considered but given the patient had complete resolution of symptoms she felt comfortable going home will discharge..          Review of Previous Records  Per chart review, on 3/2020 at [facility], robotic laparoscopic hysterectomy, fallopian tubes, left cystectomy.     Consults  [facility] stroke team  Neuroradiologist    ED COURSE   6:55 AM I met with the patient to gather history and to perform my initial exam. I discussed the plan for care while in the Emergency Department. PPE (gloves, glasses, surgical cap, N95 mask) was worn during patient encounters.      6:56 AM I called a tier 2 stroke code.    7:05 AM I spoke with Helder Gonzalez from the stroke team.    7:22 AM I spoke with Dr. Tom from [facility], the stroke neurologist.    7:50 AM I spoke with the stroke team     7:51 AM I de-escalated the stroke code    7:54 AM I spoke with [facility] Dr. Tom    8:01 AM reevaluated and updated the patient.    8:25 AM I re-evaluated the patient and updated her on lab and imaging results.    10:34 AM I re-examined the patient. We discussed lab and imaging results.     10:49 AM I re-evaluated the patient. I discussed a plan for discharge with the patient, and patient is agreeable. We discussed supportive cares at home and reasons for  "return to the ER, including new or worsening symptoms - all questions and concerns addressed. Discharged patient in stable condition and discussed plan for follow up with PCP.        At the conclusion of the encounter I discussed the results of all of the tests and the disposition. The questions were answered. The patient and  acknowledged understanding and was agreeable with the care plan.         MEDICATIONS GIVEN IN THE EMERGENCY:     Medications   iopamidol (ISOVUE-370) solution 75 mL (75 mLs Intravenous Given 1/6/23 9762)   magnesium sulfate 2 g in water intermittent infusion (0 g Intravenous Stopped 1/6/23 1058)       NEW PRESCRIPTIONS STARTED AT TODAY'S ER VISIT     New Prescriptions    No medications on file          =================================================================    HPI     Patient information was obtained from: patient, patient's     Use of : N/A        Lorraine Knapp is a 47 year old female who presents by private vehicle with  for evaluation of left-sided numbness. Patient reports that she woke up with her alarm at 0530, and immediately noticed left sided numbness and left-sided facial droop. She noted numbness and tingling to the left side of her face, her left arm, and to her index and middle fingers. Patient states that the numbness and tingling has subsided, but is still present. Patient states that she went to bed at 2300 (~8 hours ago), and felt normal. Patient states that, at 0530, she felt \"hazy and off.\"     Patient does state that she has a history of intermittent left sided arm numbness, but states that this feels different.    Patient denies headache, blurry vision, double vision, otalgias, hearing changes, rhinorrhea, trouble swallowing, chest pain, shortness of breath, abdominal pain, emesis, diarrhea, falls, dysuria, hematuria, leg weakness, leg numbness, back pain, or additional medical concerns or complaints at this time.  Patient " states that she has not traveled recently, she denies alcohol use last night, denies tobacco use, drugs. Patient endorses very occasional alcohol use. Patient denies any chronic medical conditions.     REVIEW OF SYSTEMS   Review of Systems   Constitutional: Negative for chills and fever.   HENT: Negative for ear pain, hearing loss, rhinorrhea and trouble swallowing.    Eyes: Negative for visual disturbance.   Respiratory: Negative for shortness of breath.    Cardiovascular: Negative for chest pain.   Gastrointestinal: Negative for abdominal pain, diarrhea and vomiting.   Musculoskeletal: Negative for back pain.   Neurological: Positive for numbness (to left side of face and left arm). Negative for weakness and headaches.   All other systems reviewed and are negative.       PAST MEDICAL HISTORY:   History reviewed. No pertinent past medical history.    PAST SURGICAL HISTORY:     Past Surgical History:   Procedure Laterality Date      SECTION      and      HYSTEROSCOPY, ABLATE ENDOMETRIUM HYDROTHERMAL, COMBINED N/A 2017    Procedure: HYSTEROSCOPY D&C SALVATORE ABLATION;  Surgeon: Grace Womack MD;  Location: [facility];  Service: Gynecology     TEMPOROMANDIBULAR JOINT SURGERY Bilateral 1994     WISDOM TOOTH EXTRACTION           CURRENT MEDICATIONS:   cetirizine (ZYRTEC) 10 mg cap  cyclobenzaprine (FLEXERIL) 5 MG tablet  gabapentin (NEURONTIN) 100 MG capsule  multivitamin therapeutic (THERAGRAN) tablet  naproxen (EC NAPROSYN) 500 MG EC tablet         ALLERGIES:   No Known Allergies    FAMILY HISTORY:     Family History   Problem Relation Age of Onset     Diabetes Mother      Hyperlipidemia Mother      Hypertension Mother      No Known Problems Son      Diabetes Maternal Uncle      Hyperlipidemia Maternal Grandmother      Diabetes Maternal Grandfather      No Known Problems Son      Prostate Cancer Maternal Uncle        SOCIAL HISTORY:     Social History     Socioeconomic History      "Marital status:    Tobacco Use     Smoking status: Never     Smokeless tobacco: Never   Substance and Sexual Activity     Alcohol use: Yes     Comment: Alcoholic Drinks/day: rarely     Drug use: No     Sexual activity: Yes     Partners: Male     Birth control/protection: Surgical     Comment: tiara had vas       VITALS:   BP (!) 149/92   Pulse 87   Temp 98.5  F (36.9  C) (Oral)   Resp 20   Ht 1.651 m (5' 5\")   Wt 108.9 kg (240 lb)   SpO2 97%   BMI 39.94 kg/m      PHYSICAL EXAM     Physical Exam  Vitals and nursing note reviewed. Exam conducted with a chaperone present.   Constitutional:       General: She is not in acute distress.     Appearance: Normal appearance. She is not ill-appearing, toxic-appearing or diaphoretic.   Eyes:      General: No visual field deficit.  Neurological:      Mental Status: She is alert.      GCS: GCS eye subscore is 4. GCS verbal subscore is 5. GCS motor subscore is 6.      Cranial Nerves: Cranial nerves 2-12 are intact. No cranial nerve deficit, dysarthria or facial asymmetry.      Motor: Motor function is intact.      Coordination: Coordination is intact.      Gait: Gait is intact.      Comments: Reports tingling sensation of the left side of the face this was resolved at time of evaluation.  Gait is steady  finger to nose intact  Heel to chin intact         Physical Exam   Constitutional: Well appearing, cooperative and pleasant, here with      Head: Atraumatic.     Nose: Nose normal.     Mouth/Throat: Oropharynx is clear and moist.     Eyes: EOM are normal. Pupils are equal, round, and reactive to light.     Ears: External ears normal    Neck: Normal range of motion. Neck supple.     Cardiovascular: Normal rate, regular rhythm and normal heart sounds.      Pulmonary/Chest: Normal effort  and breath sounds normal.     Abdominal: soft, non tender    Musculoskeletal: Normal range of motion.     Neurological: tingling on left arm and to index and middle " fingers    Lymphatics: No edema    : NA    Skin: Skin is warm and dry.     Psychiatric: Normal mood and affect. Behavior is normal.     [employer] Stroke Scale  Exam Interval: Baseline   Score    Level of consciousness: (0)   Alert, keenly responsive    LOC questions: (0)   Answers both questions correctly    LOC commands: (0)   Performs both tasks correctly    Best gaze: (0)   Normal    Visual: (0)   No visual loss    Facial palsy: (0)   Normal symmetrical movements    Motor arm (left): (0)   No drift    Motor arm (right): (0)   No drift    Motor leg (left): (0)   No drift    Motor leg (right): (0)   No drift    Limb ataxia: (0)   Absent    Sensory: (1)   Mild to moderate sensory loss    Best language: (0)   Normal- no aphasia    Dysarthria: (0)   Normal    Extinction and inattention: (0)   No abnormality        Total Score:  1           LAB:     All pertinent labs reviewed and interpreted.  Labs Ordered and Resulted from Time of ED Arrival to Time of ED Departure   GLUCOSE BY METER - Abnormal       Result Value    GLUCOSE BY METER POCT 181 (*)    BASIC METABOLIC PANEL - Abnormal    Sodium 137      Potassium 3.4      Chloride 101      Carbon Dioxide (CO2) 26      Anion Gap 10      Urea Nitrogen 6.3      Creatinine 0.55      Calcium 9.1      Glucose 178 (*)     GFR Estimate >90     MAGNESIUM - Abnormal    Magnesium 1.6 (*)    CBC WITH PLATELETS - Normal    WBC Count 7.2      RBC Count 5.09      Hemoglobin 14.1      Hematocrit 42.3      MCV 83      MCH 27.7      MCHC 33.3      RDW 13.0      Platelet Count 275     PARTIAL THROMBOPLASTIN TIME - Normal    aPTT 29     INR - Normal    INR 1.00     TROPONIN T, HIGH SENSITIVITY - Normal    Troponin T, High Sensitivity <6     GLUCOSE MONITOR NURSING POCT        RADIOLOGY:     Reviewed all pertinent imaging. Please see official radiology report.  Chest XR,  PA & LAT   Final Result   IMPRESSION: Negative chest.      MRA Brain ([place]) wo Contrast    Final Result   CONCLUSION:   HEAD MRI:    1.  Normal brain MRI for patient age without acute intracranial abnormality.   2.  Mild mucosal thickening in inferior maxillary sinuses, right greater than left.      HEAD MRA:    1.  No segmental stenosis, large vessel occlusion, aneurysm, or high flow AVM identified.   2.  Incidentally noted vascular variants as noted above.         MR Brain w/o Contrast   Final Result   CONCLUSION:   HEAD MRI:    1.  Normal brain MRI for patient age without acute intracranial abnormality.   2.  Mild mucosal thickening in inferior maxillary sinuses, right greater than left.      HEAD MRA:    1.  No segmental stenosis, large vessel occlusion, aneurysm, or high flow AVM identified.   2.  Incidentally noted vascular variants as noted above.         CTA Head Neck with Contrast   Final Result   IMPRESSION:    HEAD CT:   1.  Unremarkable appearance to the brain with no acute intracranial abnormality.      2.  Mild mucosal thickening in the right maxillary sinus.      HEAD CTA:    1.  Multifocal mild narrowings in the right HELGA with a moderate narrowing in the right A2 segment.      2.  Multifocal mild narrowings of the bilateral PCA.      3.  No aneurysm and no high flow vascular malformation.      NECK CTA:   1.  No significant stenosis of the neck vessels by NASCET criteria.      Head CT discussed with Dr. Tapia by phone at 0723 hours on 1/6/2023. Head and neck CTA discussed with Dr. Tapia by phone at 0753 hours on 1/6/2023.           EKG:     EKG #1  Normal sinus rhythm with sinus arrhythmia.  Normal anterior progression.  Normal axis.  .    Time:[id number]    Ventricular rate 89 bmp  Axis normal  KS interval 182 ms  QRS duration 96 ms  QT//491 ms    Compared to previous EKG on no previous EKGs available for comparison  I have independently reviewed and interpreted the EKG(s) documented above.      PROCEDURES:     Procedures      I, Elise Gryler, am serving as a scribe to  document services personally performed by Dr. Tapia based on my observation and the provider's statements to me. I, Dunia Tapia MD attest that Elise Gryler is acting in a scribe capacity, has observed my performance of the services and has documented them in accordance with my direction.    Dunia Tapia M.D.  Emergency Medicine  [facility] EMERGENCY DEPARTMENT  [address] [place] [id number] [phone]  Dept: [phone]       Dunia Tapia MD  01/06/23 2687

## 2023-01-28 ENCOUNTER — HEALTH MAINTENANCE LETTER (OUTPATIENT)
Age: 48
End: 2023-01-28

## 2024-02-25 ENCOUNTER — HEALTH MAINTENANCE LETTER (OUTPATIENT)
Age: 49
End: 2024-02-25

## 2025-02-09 ENCOUNTER — HEALTH MAINTENANCE LETTER (OUTPATIENT)
Age: 50
End: 2025-02-09

## 2025-03-09 ENCOUNTER — HEALTH MAINTENANCE LETTER (OUTPATIENT)
Age: 50
End: 2025-03-09